# Patient Record
Sex: FEMALE | Race: OTHER | HISPANIC OR LATINO
[De-identification: names, ages, dates, MRNs, and addresses within clinical notes are randomized per-mention and may not be internally consistent; named-entity substitution may affect disease eponyms.]

---

## 2020-06-09 ENCOUNTER — RESULT REVIEW (OUTPATIENT)
Age: 25
End: 2020-06-09

## 2020-07-06 ENCOUNTER — ASOB RESULT (OUTPATIENT)
Age: 25
End: 2020-07-06

## 2020-07-06 ENCOUNTER — APPOINTMENT (OUTPATIENT)
Dept: ANTEPARTUM | Facility: CLINIC | Age: 25
End: 2020-07-06
Payer: COMMERCIAL

## 2020-07-06 PROCEDURE — 76813 OB US NUCHAL MEAS 1 GEST: CPT | Mod: 59

## 2020-07-06 PROCEDURE — 76801 OB US < 14 WKS SINGLE FETUS: CPT

## 2020-07-20 PROBLEM — Z00.00 ENCOUNTER FOR PREVENTIVE HEALTH EXAMINATION: Status: ACTIVE | Noted: 2020-07-20

## 2020-08-19 ENCOUNTER — APPOINTMENT (OUTPATIENT)
Dept: ANTEPARTUM | Facility: CLINIC | Age: 25
End: 2020-08-19
Payer: COMMERCIAL

## 2020-08-19 ENCOUNTER — ASOB RESULT (OUTPATIENT)
Age: 25
End: 2020-08-19

## 2020-08-19 PROCEDURE — 76811 OB US DETAILED SNGL FETUS: CPT

## 2020-08-19 PROCEDURE — 99241 OFFICE CONSULTATION NEW/ESTAB PATIENT 15 MIN: CPT | Mod: 25

## 2020-08-22 ENCOUNTER — TRANSCRIPTION ENCOUNTER (OUTPATIENT)
Age: 25
End: 2020-08-22

## 2020-09-15 ENCOUNTER — ASOB RESULT (OUTPATIENT)
Age: 25
End: 2020-09-15

## 2020-09-15 ENCOUNTER — APPOINTMENT (OUTPATIENT)
Dept: MATERNAL FETAL MEDICINE | Facility: CLINIC | Age: 25
End: 2020-09-15

## 2021-01-09 ENCOUNTER — INPATIENT (INPATIENT)
Facility: HOSPITAL | Age: 26
LOS: 0 days | Discharge: HOME | End: 2021-01-10
Attending: OBSTETRICS & GYNECOLOGY | Admitting: OBSTETRICS & GYNECOLOGY

## 2021-01-09 ENCOUNTER — TRANSCRIPTION ENCOUNTER (OUTPATIENT)
Age: 26
End: 2021-01-09

## 2021-01-09 ENCOUNTER — OUTPATIENT (OUTPATIENT)
Dept: INPATIENT UNIT | Facility: HOSPITAL | Age: 26
LOS: 1 days | Discharge: HOME | End: 2021-01-09

## 2021-01-09 VITALS
TEMPERATURE: 99 F | SYSTOLIC BLOOD PRESSURE: 105 MMHG | DIASTOLIC BLOOD PRESSURE: 65 MMHG | RESPIRATION RATE: 15 BRPM | HEART RATE: 83 BPM

## 2021-01-09 VITALS — TEMPERATURE: 99 F

## 2021-01-09 VITALS
SYSTOLIC BLOOD PRESSURE: 109 MMHG | HEART RATE: 71 BPM | TEMPERATURE: 99 F | RESPIRATION RATE: 16 BRPM | DIASTOLIC BLOOD PRESSURE: 66 MMHG

## 2021-01-09 DIAGNOSIS — Z3A.00 WEEKS OF GESTATION OF PREGNANCY NOT SPECIFIED: ICD-10-CM

## 2021-01-09 DIAGNOSIS — O26.899 OTHER SPECIFIED PREGNANCY RELATED CONDITIONS, UNSPECIFIED TRIMESTER: ICD-10-CM

## 2021-01-09 DIAGNOSIS — Z90.89 ACQUIRED ABSENCE OF OTHER ORGANS: Chronic | ICD-10-CM

## 2021-01-09 LAB
BASOPHILS # BLD AUTO: 0.05 K/UL — SIGNIFICANT CHANGE UP (ref 0–0.2)
BASOPHILS NFR BLD AUTO: 0.4 % — SIGNIFICANT CHANGE UP (ref 0–2)
BLD GP AB SCN SERPL QL: NEGATIVE — SIGNIFICANT CHANGE UP
EOSINOPHIL # BLD AUTO: 0.12 K/UL — SIGNIFICANT CHANGE UP (ref 0–0.5)
EOSINOPHIL NFR BLD AUTO: 0.9 % — SIGNIFICANT CHANGE UP (ref 0–6)
HCT VFR BLD CALC: 44.2 % — SIGNIFICANT CHANGE UP (ref 34.5–45)
HGB BLD-MCNC: 14.8 G/DL — SIGNIFICANT CHANGE UP (ref 11.5–15.5)
IANC: 10.22 K/UL — HIGH (ref 1.5–8.5)
IMM GRANULOCYTES NFR BLD AUTO: 1.5 % — SIGNIFICANT CHANGE UP (ref 0–1.5)
LYMPHOCYTES # BLD AUTO: 16.1 % — SIGNIFICANT CHANGE UP (ref 13–44)
LYMPHOCYTES # BLD AUTO: 2.21 K/UL — SIGNIFICANT CHANGE UP (ref 1–3.3)
MCHC RBC-ENTMCNC: 30.8 PG — SIGNIFICANT CHANGE UP (ref 27–34)
MCHC RBC-ENTMCNC: 33.5 GM/DL — SIGNIFICANT CHANGE UP (ref 32–36)
MCV RBC AUTO: 91.9 FL — SIGNIFICANT CHANGE UP (ref 80–100)
MONOCYTES # BLD AUTO: 0.91 K/UL — HIGH (ref 0–0.9)
MONOCYTES NFR BLD AUTO: 6.6 % — SIGNIFICANT CHANGE UP (ref 2–14)
NEUTROPHILS # BLD AUTO: 10.22 K/UL — HIGH (ref 1.8–7.4)
NEUTROPHILS NFR BLD AUTO: 74.5 % — SIGNIFICANT CHANGE UP (ref 43–77)
NRBC # BLD: 0 /100 WBCS — SIGNIFICANT CHANGE UP
NRBC # FLD: 0 K/UL — SIGNIFICANT CHANGE UP
PLATELET # BLD AUTO: 220 K/UL — SIGNIFICANT CHANGE UP (ref 150–400)
RBC # BLD: 4.81 M/UL — SIGNIFICANT CHANGE UP (ref 3.8–5.2)
RBC # FLD: 12.8 % — SIGNIFICANT CHANGE UP (ref 10.3–14.5)
RH IG SCN BLD-IMP: POSITIVE — SIGNIFICANT CHANGE UP
SARS-COV-2 RNA SPEC QL NAA+PROBE: DETECTED
WBC # BLD: 13.71 K/UL — HIGH (ref 3.8–10.5)
WBC # FLD AUTO: 13.71 K/UL — HIGH (ref 3.8–10.5)

## 2021-01-09 RX ORDER — SODIUM CHLORIDE 9 MG/ML
1000 INJECTION, SOLUTION INTRAVENOUS
Refills: 0 | Status: DISCONTINUED | OUTPATIENT
Start: 2021-01-09 | End: 2021-01-10

## 2021-01-09 RX ORDER — MORPHINE SULFATE 50 MG/1
4 CAPSULE, EXTENDED RELEASE ORAL ONCE
Refills: 0 | Status: DISCONTINUED | OUTPATIENT
Start: 2021-01-09 | End: 2021-01-09

## 2021-01-09 RX ORDER — OXYTOCIN 10 UNIT/ML
333.33 VIAL (ML) INJECTION
Qty: 20 | Refills: 0 | Status: DISCONTINUED | OUTPATIENT
Start: 2021-01-09 | End: 2021-01-10

## 2021-01-09 RX ADMIN — SODIUM CHLORIDE 125 MILLILITER(S): 9 INJECTION, SOLUTION INTRAVENOUS at 19:30

## 2021-01-09 RX ADMIN — MORPHINE SULFATE 4 MILLIGRAM(S): 50 CAPSULE, EXTENDED RELEASE ORAL at 19:10

## 2021-01-09 RX ADMIN — MORPHINE SULFATE 4 MILLIGRAM(S): 50 CAPSULE, EXTENDED RELEASE ORAL at 19:09

## 2021-01-09 NOTE — OB PROVIDER TRIAGE NOTE - NSHPPHYSICALEXAM_GEN_ALL_CORE
Assessment reveals VSS, abdomen soft, NT, gravid.   VE 0.5/50/-3 ( exam unchanged )  Cat 1 FHT, ctx 2-6 on toco  EFW 7.0 by palp  Vtx confirmed by sono    Paitent desires pain management.

## 2021-01-09 NOTE — OB RN TRIAGE NOTE - NS_TRIAGEMEDICALSCREENINGPERFORMEDBY_OBGYN_ALL_OB_FT
pellicane O-Z Plasty Text: The defect edges were debeveled with a #15 scalpel blade.  Given the location of the defect, shape of the defect and the proximity to free margins an O-Z plasty (double transposition flap) was deemed most appropriate.  Using a sterile surgical marker, the appropriate transposition flaps were drawn incorporating the defect and placing the expected incisions within the relaxed skin tension lines where possible.    The area thus outlined was incised deep to adipose tissue with a #15 scalpel blade.  The skin margins were undermined to an appropriate distance in all directions utilizing iris scissors.  Hemostasis was achieved with electrocautery.  The flaps were then transposed into place, one clockwise and the other counterclockwise, and anchored with interrupted buried subcutaneous sutures.

## 2021-01-09 NOTE — OB PROVIDER TRIAGE NOTE - NSHPPHYSICALEXAM_GEN_ALL_CORE
Assessment reveals VSS, abdomen soft, NT, gravid.  VE 0/50/-3 Assessment reveals VSS, abdomen soft, NT, gravid.  VE 0.5/50/-3  Cat 1 FHT, ctx 2-3 on toco

## 2021-01-09 NOTE — OB PROVIDER TRIAGE NOTE - NSOBPROVIDERNOTE_OBGYN_ALL_OB_FT
Plan D/W Dr. Grigsby, early labor  Return for increase in contraction frequency/intensity, leaking of fluid, vaginal bleeding or decrease in fetal movement.

## 2021-01-09 NOTE — OB PROVIDER H&P - HISTORY OF PRESENT ILLNESS
26yo  @ 39.5 presents with c/o painful contractions every 7 minutes. Denies LOF, VB and reports GFM, Was seen earlier today and was dc'd home at 0.5/50. Reports severity of contractions has worsened.  Reports positive COVID from 1/3. At time of disgnosis patient reported dry cough. At this time reports symptoms as no taste and no smell.     H/O 2010 Tonsillectomy    AP course uncomplicated  GBS negative  26yo  @ 39.5 presents with c/o painful contractions every 7 minutes. Denies LOF, VB and reports GFM, Was seen earlier today and was dc'd home at 0.5/50. Reports severity of contractions has worsened.  Reports positive COVID from 1/3. At time of disgnosis patient reported dry cough. At this time reports symptoms as no taste and no smell.   Partner COVID pos from 1/3. At this time reports mild cough    H/O 2010 Tonsillectomy    AP course uncomplicated  GBS negative

## 2021-01-09 NOTE — OB RN TRIAGE NOTE - NS_TRIAGEINITIALRNASSESSMENT_OBGYN_ALL_OB_FT
(J20.9) Acute bronchitis, unspecified organism  Comment:    Plan: fluticasone-salmeterol (ADVAIR) 100-50 MCG/DOSE        diskus inhaler, azithromycin (ZITHROMAX) 500 MG        tablet             (J30.1) Chronic seasonal allergic rhinitis due to pollen  Comment:    Plan: montelukast (SINGULAIR) 10 MG tablet             (J45.30) Mild persistent asthma without complication  Comment:    Plan: montelukast (SINGULAIR) 10 MG tablet             (R06.2) Wheezing  Comment:    Plan: albuterol (PROAIR HFA/PROVENTIL HFA/VENTOLIN         HFA) 108 (90 BASE) MCG/ACT Inhaler             (J45.21) Mild intermittent asthma with acute exacerbation  Comment:    Plan: albuterol (PROAIR HFA/PROVENTIL HFA/VENTOLIN         HFA) 108 (90 BASE) MCG/ACT Inhaler, predniSONE         (DELTASONE) 20 MG tablet             (E11.21) Type 2 diabetes mellitus with diabetic nephropathy, without long-term current use of insulin (H)  Comment:    Plan: glipiZIDE (GLUCOTROL XL) 10 MG 24 hr tablet                    cr

## 2021-01-09 NOTE — OB PROVIDER TRIAGE NOTE - HISTORY OF PRESENT ILLNESS
26yo  @ 39.5 presents with c/o contractions every few minutes. Denies LOF, VB and reports GFM.  COVID positive 1/3- at this time reports no taste no smell. FOB positive as well.     H/O Tonsillectomy     AP course uncomplicated

## 2021-01-09 NOTE — OB PROVIDER TRIAGE NOTE - ADDITIONAL INSTRUCTIONS
Return for increase in contraction frequency/intensity, leaking of fluid, vaginal bleeding or decrease in fetal movement.

## 2021-01-10 ENCOUNTER — INPATIENT (INPATIENT)
Facility: HOSPITAL | Age: 26
LOS: 1 days | Discharge: HOME | End: 2021-01-12
Attending: OBSTETRICS & GYNECOLOGY | Admitting: OBSTETRICS & GYNECOLOGY

## 2021-01-10 ENCOUNTER — TRANSCRIPTION ENCOUNTER (OUTPATIENT)
Age: 26
End: 2021-01-10

## 2021-01-10 VITALS — TEMPERATURE: 97 F | DIASTOLIC BLOOD PRESSURE: 62 MMHG | HEART RATE: 74 BPM | SYSTOLIC BLOOD PRESSURE: 99 MMHG

## 2021-01-10 VITALS — TEMPERATURE: 98 F

## 2021-01-10 DIAGNOSIS — Z90.89 ACQUIRED ABSENCE OF OTHER ORGANS: Chronic | ICD-10-CM

## 2021-01-10 DIAGNOSIS — O26.899 OTHER SPECIFIED PREGNANCY RELATED CONDITIONS, UNSPECIFIED TRIMESTER: ICD-10-CM

## 2021-01-10 DIAGNOSIS — Z3A.00 WEEKS OF GESTATION OF PREGNANCY NOT SPECIFIED: ICD-10-CM

## 2021-01-10 LAB
BLD GP AB SCN SERPL QL: NEGATIVE — SIGNIFICANT CHANGE UP
RH IG SCN BLD-IMP: POSITIVE — SIGNIFICANT CHANGE UP
SARS-COV-2 IGG SERPL QL IA: NEGATIVE — SIGNIFICANT CHANGE UP
SARS-COV-2 IGM SERPL IA-ACNC: 0.09 INDEX — SIGNIFICANT CHANGE UP

## 2021-01-10 RX ORDER — BUTORPHANOL TARTRATE 2 MG/ML
2 INJECTION, SOLUTION INTRAMUSCULAR; INTRAVENOUS ONCE
Refills: 0 | Status: DISCONTINUED | OUTPATIENT
Start: 2021-01-10 | End: 2021-01-10

## 2021-01-10 RX ORDER — TETANUS TOXOID, REDUCED DIPHTHERIA TOXOID AND ACELLULAR PERTUSSIS VACCINE, ADSORBED 5; 2.5; 8; 8; 2.5 [IU]/.5ML; [IU]/.5ML; UG/.5ML; UG/.5ML; UG/.5ML
0.5 SUSPENSION INTRAMUSCULAR ONCE
Refills: 0 | Status: DISCONTINUED | OUTPATIENT
Start: 2021-01-10 | End: 2021-01-12

## 2021-01-10 RX ORDER — HYDROCORTISONE 1 %
1 OINTMENT (GRAM) TOPICAL EVERY 6 HOURS
Refills: 0 | Status: DISCONTINUED | OUTPATIENT
Start: 2021-01-10 | End: 2021-01-12

## 2021-01-10 RX ORDER — SODIUM CHLORIDE 9 MG/ML
1000 INJECTION, SOLUTION INTRAVENOUS
Refills: 0 | Status: DISCONTINUED | OUTPATIENT
Start: 2021-01-10 | End: 2021-01-10

## 2021-01-10 RX ORDER — SIMETHICONE 80 MG/1
80 TABLET, CHEWABLE ORAL EVERY 4 HOURS
Refills: 0 | Status: DISCONTINUED | OUTPATIENT
Start: 2021-01-10 | End: 2021-01-12

## 2021-01-10 RX ORDER — DIBUCAINE 1 %
1 OINTMENT (GRAM) RECTAL EVERY 6 HOURS
Refills: 0 | Status: DISCONTINUED | OUTPATIENT
Start: 2021-01-10 | End: 2021-01-12

## 2021-01-10 RX ORDER — OXYCODONE HYDROCHLORIDE 5 MG/1
5 TABLET ORAL ONCE
Refills: 0 | Status: DISCONTINUED | OUTPATIENT
Start: 2021-01-10 | End: 2021-01-12

## 2021-01-10 RX ORDER — PRAMOXINE HYDROCHLORIDE 150 MG/15G
1 AEROSOL, FOAM RECTAL EVERY 4 HOURS
Refills: 0 | Status: DISCONTINUED | OUTPATIENT
Start: 2021-01-10 | End: 2021-01-12

## 2021-01-10 RX ORDER — AER TRAVELER 0.5 G/1
1 SOLUTION RECTAL; TOPICAL EVERY 4 HOURS
Refills: 0 | Status: DISCONTINUED | OUTPATIENT
Start: 2021-01-10 | End: 2021-01-12

## 2021-01-10 RX ORDER — ACETAMINOPHEN 500 MG
975 TABLET ORAL
Refills: 0 | Status: DISCONTINUED | OUTPATIENT
Start: 2021-01-10 | End: 2021-01-12

## 2021-01-10 RX ORDER — SODIUM CHLORIDE 9 MG/ML
1000 INJECTION, SOLUTION INTRAVENOUS ONCE
Refills: 0 | Status: COMPLETED | OUTPATIENT
Start: 2021-01-10 | End: 2021-01-10

## 2021-01-10 RX ORDER — OXYTOCIN 10 UNIT/ML
333.33 VIAL (ML) INJECTION
Qty: 20 | Refills: 0 | Status: DISCONTINUED | OUTPATIENT
Start: 2021-01-10 | End: 2021-01-10

## 2021-01-10 RX ORDER — OXYTOCIN 10 UNIT/ML
333.33 VIAL (ML) INJECTION
Qty: 20 | Refills: 0 | Status: DISCONTINUED | OUTPATIENT
Start: 2021-01-10 | End: 2021-01-11

## 2021-01-10 RX ORDER — IBUPROFEN 200 MG
600 TABLET ORAL EVERY 6 HOURS
Refills: 0 | Status: COMPLETED | OUTPATIENT
Start: 2021-01-10 | End: 2021-12-09

## 2021-01-10 RX ORDER — OXYCODONE HYDROCHLORIDE 5 MG/1
5 TABLET ORAL
Refills: 0 | Status: DISCONTINUED | OUTPATIENT
Start: 2021-01-10 | End: 2021-01-12

## 2021-01-10 RX ORDER — BENZOCAINE 10 %
1 GEL (GRAM) MUCOUS MEMBRANE EVERY 6 HOURS
Refills: 0 | Status: DISCONTINUED | OUTPATIENT
Start: 2021-01-10 | End: 2021-01-12

## 2021-01-10 RX ORDER — DIPHENHYDRAMINE HCL 50 MG
25 CAPSULE ORAL EVERY 6 HOURS
Refills: 0 | Status: DISCONTINUED | OUTPATIENT
Start: 2021-01-10 | End: 2021-01-12

## 2021-01-10 RX ORDER — MAGNESIUM HYDROXIDE 400 MG/1
30 TABLET, CHEWABLE ORAL
Refills: 0 | Status: DISCONTINUED | OUTPATIENT
Start: 2021-01-10 | End: 2021-01-12

## 2021-01-10 RX ORDER — LANOLIN
1 OINTMENT (GRAM) TOPICAL EVERY 6 HOURS
Refills: 0 | Status: DISCONTINUED | OUTPATIENT
Start: 2021-01-10 | End: 2021-01-12

## 2021-01-10 RX ORDER — SODIUM CHLORIDE 9 MG/ML
3 INJECTION INTRAMUSCULAR; INTRAVENOUS; SUBCUTANEOUS EVERY 8 HOURS
Refills: 0 | Status: DISCONTINUED | OUTPATIENT
Start: 2021-01-10 | End: 2021-01-12

## 2021-01-10 RX ORDER — KETOROLAC TROMETHAMINE 30 MG/ML
30 SYRINGE (ML) INJECTION ONCE
Refills: 0 | Status: DISCONTINUED | OUTPATIENT
Start: 2021-01-10 | End: 2021-01-10

## 2021-01-10 RX ADMIN — BUTORPHANOL TARTRATE 2 MILLIGRAM(S): 2 INJECTION, SOLUTION INTRAMUSCULAR; INTRAVENOUS at 06:47

## 2021-01-10 RX ADMIN — SODIUM CHLORIDE 125 MILLILITER(S): 9 INJECTION, SOLUTION INTRAVENOUS at 06:32

## 2021-01-10 RX ADMIN — SODIUM CHLORIDE 1000 MILLILITER(S): 9 INJECTION, SOLUTION INTRAVENOUS at 01:47

## 2021-01-10 RX ADMIN — Medication 1000 MILLIUNIT(S)/MIN: at 22:33

## 2021-01-10 RX ADMIN — Medication 30 MILLIGRAM(S): at 22:50

## 2021-01-10 NOTE — DISCHARGE NOTE ANTEPARTUM - MEDICATION SUMMARY - MEDICATIONS TO TAKE
I will START or STAY ON the medications listed below when I get home from the hospital:    PNV Prenatal oral tablet  -- Indication: For Weeks of gestation of pregnancy not specified

## 2021-01-10 NOTE — DISCHARGE NOTE OB - PROVIDER TOKENS
PROVIDER:[TOKEN:[[id number]]] PROVIDER:[TOKEN:[[id number]]],PROVIDER:[TOKEN:[[id number]]]

## 2021-01-10 NOTE — DISCHARGE NOTE ANTEPARTUM - PATIENT PORTAL LINK FT
You can access the FollowMyHealth Patient Portal offered by [facility] by registering at the following website: [email or web address]. By joining [employer]’s FollowMyHealth portal, you will also be able to view your health information using other applications (apps) compatible with our system.

## 2021-01-10 NOTE — OB PROVIDER H&P - ASSESSMENT
25y White female  at 39w6d recently discharged from the hospital, returns c/o very strong and painful contractions.  Reports +FM, no vaginal bleeding, no ROM or LOF  [facility] care: [facility]  GBS: Negative  Reports positive COVID from 1/3. At time of diagnosis patient reported dry cough. At this time reports symptoms as no taste and no smell.   Partner COVID pos from 1/3. At this time reports mild cough    GYN: Denies any h/o STDs, fibroids, ovarian Cyst, or abnormal pap smear  OBH:   PAST MEDICAL: Covid 19+ since 1/3/21  PAST SURGICAL HISTORY:  Tonsillectomy    No Known Allergies    MEDICATIONS: PNV    T(C): 36.9 (01-10-21 @ 04:26), Max: 37.0 (21 @ 08:58)  HR: 73 (01-10-21 @ 04:26) (64 - 88)  BP: 102/66 (01-10-21 @ 04:26) (96/58 - 109/66)  RR: 17 (01-10-21 @ 04:26) (15 - 18)  SpO2: 99% (01-10-21 @ 01:43) (94% - 100%)    Heart: RRR  Lungs: CTA  Abdomen: Gravid, soft, NT    NST: Reactive with moderate variability, Category 1 tracing  [place]: Irregular contractions  VE: 50/-3, intact membranes    A/P: 25y White female  at 39w6d with prodromal labor  D/w Dr Grigsby  -Admit to labor and delivery  -Pain Management:   -Cont EFM/[place]  -Admission labs: CBC, RPR, T&S  -IV hydration  -Clear liquid diet

## 2021-01-10 NOTE — OB PROVIDER H&P - HISTORY OF PRESENT ILLNESS
25y White female  at 39w6d recently discharged from the hospital, returns c/o very strong and painful contractions, requesting pain management  Reports +FM, no vaginal bleeding, no ROM or LOF  Pren[facility] care: [facility]  GBS: Negative  Reports positive COVID from 1/3. At time of diagnosis patient reported dry cough. At this time reports symptoms as no taste and no smell.   Partner COVID pos from 1/3. At this time reports mild cough

## 2021-01-10 NOTE — DISCHARGE NOTE OB - HOSPITAL COURSE
patient was given IV stadol and had 2 decelerations on tracing so decision was made to induce for category 2. PO cytotec given. Patient progressed to fully dilated.  viable infant

## 2021-01-10 NOTE — DISCHARGE NOTE OB - CARE PROVIDER_API CALL
Sarah Grigsby)  Obstetrics and Gynecology  [address] [place] [address]  Phone: [phone]  Fax: [phone]  Follow Up Time:    Sarah Grigsby)  Obstetrics and Gynecology  [address]  [place] [address]  Phone: [phone]  Fax: [phone]  Follow Up Time:     Nay Rodriguez)  Obstetrics and Gynecology  [address] [place] [address]  Phone: [phone]  Fax: [phone]  Follow Up Time:

## 2021-01-10 NOTE — OB NEONATOLOGY/PEDIATRICIAN DELIVERY SUMMARY - NSPEDSNEONOTESA_OBGYN_ALL_OB_FT
Baby is a 39.6 wk GA M born to a 24 y/o  mother via  IOL for cat 2. Maternal history uncomplicated. Prenatal history uncomplicated. Maternal BT O+. PNL neg, NR, and immune. GBS neg on . SROM at 1820 on 1/10, bloody fluids. Baby born stunned, deep suction and tactile stim performed with response from baby. WDSS. Apgars 6/9. EOS 0.30. Mom plans to breastfeed, would like hepB and circ. COVID status neg.

## 2021-01-10 NOTE — DISCHARGE NOTE OB - CARE PLAN
Principal Discharge DX:	Vaginal delivery  Goal:	return to normal DLs  Assessment and plan of treatment:	stable; vaginal rest

## 2021-01-10 NOTE — OB PROVIDER DELIVERY SUMMARY - NSPROVIDERDELIVERYNOTE_OBGYN_ALL_OB_FT
viable male infant. Infant suctioned for thick meconium then cord clamped and cut and infant placed in [facility] with pediatricians in attendance. Placenta delivered via CCT. Second degree perineal laceration repaired with 2-0 chromic. Patient tolerated procedure well.

## 2021-01-10 NOTE — DISCHARGE NOTE ANTEPARTUM - HOSPITAL COURSE
26yo  @ 39.5 presents with c/o painful contractions every 7 minutes. She was admitted for therapeutic rest and received morphine.   After 6 hours, the patient was re-examined and found to be the same exam.   Patient was discharged in stable condition, given labor precautions, and instructed to resume normal prenatal care.

## 2021-01-10 NOTE — DISCHARGE NOTE OB - CARE PROVIDERS DIRECT ADDRESSES
,DirectAddress_Unknown ,DirectAddress_Unknown,[email or web address]

## 2021-01-10 NOTE — OB PROVIDER LABOR PROGRESS NOTE - NS_SUBJECTIVE/OBJECTIVE_OBGYN_ALL_OB_FT
patient c/o rectal pressure  VSS. Afebrile
Patient seen and examined for cervical change.
R1 OB Labor Note    S: Patient seen and examined at bedside.     T(C): 37.3 (01-10-21 @ 18:32), Max: 37.4 (01-10-21 @ 16:33)  HR: 72 (01-10-21 @ 20:03) (61 - 110)  BP: 108/64 (01-10-21 @ 20:03) (83/35 - 125/74)  BP(mean): --  ABP: --  ABP(mean): --  RR: 18 (01-10-21 @ 06:53) (17 - 18)  SpO2: 99% (01-10-21 @ 20:08) (90% - 100%)  Wt(kg): --  CVP(mm Hg): --  CI: --  CAPILLARY BLOOD GLUCOSE       N/A      01-09 @ 07:01  -  01-10 @ 07:00  --------------------------------------------------------  IN:    Lactated Ringers: 125 mL  Total IN: 125 mL    OUT:  Total OUT: 0 mL    Total NET: 125 mL      01-10 @ 07:01  -  01-10 @ 20:10  --------------------------------------------------------  IN:    Lactated Ringers: 2375 mL  Total IN: 2375 mL    OUT:    Indwelling Catheter - Urethral (mL): 1400 mL  Total OUT: 1400 mL    Total NET: 975 mL

## 2021-01-10 NOTE — DISCHARGE NOTE OB - PATIENT PORTAL LINK FT
You can access the FollowMyHealth Patient Portal offered by  by registering at the following website: [email or web address]. By joining [employer]’s FollowMyHealth portal, you will also be able to view your health information using other applications (apps) compatible with our system.

## 2021-01-10 NOTE — OB PROVIDER H&P - PROBLEM SELECTOR PLAN 1
D/w Dr Grigsby  -Admit to labor and delivery  -Pain Management:   -Cont EFM/[place]  -Admission labs: CBC, RPR, T&S  -IV hydration  -Clear liquid diet

## 2021-01-10 NOTE — DISCHARGE NOTE ANTEPARTUM - PLAN OF CARE
Please resume routine prenatal care as instructed.  If you have contractions every five minutes for 1 hour, vaginal bleeding, leakage of fluid or you don't feel the baby move please call your doctor or come to the emergency department. resume prenatal care

## 2021-01-10 NOTE — OB PROVIDER LABOR PROGRESS NOTE - ASSESSMENT
IUP at term with + COVID for vaginal delivery
Induction for Category 2 tracing  - 4PO Cytotec  [place] Sow PGY3  d/w Dr. Grigsby
A/P:   - Labor: IOL for NRFHT. Pt COVID+ (asym). S/p PO. Pt making change and dalila. If ctx space, start Pit.  - Fetus: Cat 1  - GBS: neg  - Pain: epi in situ    Bria Walsh, PGY-1  d/w Dr. Grigsby

## 2021-01-10 NOTE — DISCHARGE NOTE ANTEPARTUM - CARE PLAN
Principal Discharge DX:	Pregnancy  Goal:	resume prenatal care  Assessment and plan of treatment:	Please resume routine prenatal care as instructed.  If you have contractions every five minutes for 1 hour, vaginal bleeding, leakage of fluid or you don't feel the baby move please call your doctor or come to the emergency department.

## 2021-01-10 NOTE — CHART NOTE - NSCHARTNOTEFT_GEN_A_CORE
S:  Pt seen and examined for cervical change due to deceleration     O:   T(C): 37.0 (08:35)  HR: 90 (12:32)  BP: 96/51 (12:32)  RR: 18 (06:53)  SpO2: 99% (12:23)  SVE: 2/70/-3  EFM: baseline 130 mod jeannie +accel + decel lasting 4 min with courtney to 80s, returned to baseline spontaneously  [place]: irregular    A/P: 25y P0 IOL Cat II  -c/w po cytotec  - CRNA called for BP support s/p epi due to hypotension    TLal PGY4

## 2021-01-10 NOTE — OB RN DELIVERY SUMMARY - NS_SEPSISRSKCALC_OBGYN_ALL_OB_FT
EOS calculated successfully. EOS Risk Factor: 0.5/1000 live births ([facility] national incidence); GA=39w6d; Temp=99.68; ROM=1.1; GBS='Negative'; Antibiotics='No antibiotics or any antibiotics < 2 hrs prior to birth'

## 2021-01-10 NOTE — DISCHARGE NOTE ANTEPARTUM - CARE PROVIDER_API CALL
Sarah Grigsby)  Obstetrics and Gynecology  [address] [place] [address]  Phone: [phone]  Fax: [phone]  Follow Up Time:

## 2021-01-10 NOTE — OB PROVIDER H&P - NSHPLABSRESULTS_GEN_ALL_CORE
Admission labs sent                         14.8   13.71 )-----------( 220      ( 09 Jan 2021 19:51 )             44.2

## 2021-01-11 LAB
T PALLIDUM AB TITR SER: NEGATIVE — SIGNIFICANT CHANGE UP
T PALLIDUM AB TITR SER: NEGATIVE — SIGNIFICANT CHANGE UP

## 2021-01-11 RX ORDER — IBUPROFEN 200 MG
600 TABLET ORAL EVERY 6 HOURS
Refills: 0 | Status: DISCONTINUED | OUTPATIENT
Start: 2021-01-11 | End: 2021-01-12

## 2021-01-11 RX ADMIN — SODIUM CHLORIDE 3 MILLILITER(S): 9 INJECTION INTRAMUSCULAR; INTRAVENOUS; SUBCUTANEOUS at 13:53

## 2021-01-11 RX ADMIN — SODIUM CHLORIDE 3 MILLILITER(S): 9 INJECTION INTRAMUSCULAR; INTRAVENOUS; SUBCUTANEOUS at 06:52

## 2021-01-11 RX ADMIN — Medication 975 MILLIGRAM(S): at 08:53

## 2021-01-11 RX ADMIN — OXYCODONE HYDROCHLORIDE 5 MILLIGRAM(S): 5 TABLET ORAL at 08:53

## 2021-01-11 RX ADMIN — Medication 1 TABLET(S): at 09:26

## 2021-01-11 RX ADMIN — Medication 975 MILLIGRAM(S): at 22:29

## 2021-01-11 RX ADMIN — Medication 975 MILLIGRAM(S): at 02:45

## 2021-01-11 RX ADMIN — Medication 600 MILLIGRAM(S): at 06:52

## 2021-01-11 RX ADMIN — Medication 975 MILLIGRAM(S): at 18:13

## 2021-01-11 RX ADMIN — OXYCODONE HYDROCHLORIDE 5 MILLIGRAM(S): 5 TABLET ORAL at 18:00

## 2021-01-12 VITALS
TEMPERATURE: 97 F | OXYGEN SATURATION: 98 % | HEART RATE: 76 BPM | RESPIRATION RATE: 16 BRPM | DIASTOLIC BLOOD PRESSURE: 65 MMHG | SYSTOLIC BLOOD PRESSURE: 111 MMHG

## 2021-01-12 RX ADMIN — OXYCODONE HYDROCHLORIDE 5 MILLIGRAM(S): 5 TABLET ORAL at 09:27

## 2021-01-12 RX ADMIN — Medication 975 MILLIGRAM(S): at 09:27

## 2021-01-12 RX ADMIN — Medication 1 TABLET(S): at 18:17

## 2021-01-12 NOTE — LACTATION INITIAL EVALUATION - INFANT ACTIVITY
Labor Epidural    Patient location during procedure: OB  Performed By  Anesthesiologist: GUADALUPE CUELLAR  Preanesthetic Checklist  Completed: patient identified, site marked, surgical consent, pre-op evaluation, timeout performed, IV checked, risks and benefits discussed and monitors and equipment checked  Epidural Block Prep:  Pt Position:sitting  Sterile Tech:cap, gloves, mask and sterile barrier  Prep:chlorhexidine gluconate and isopropyl alcohol  Monitoring:blood pressure monitoring, continuous pulse oximetry and EKG  Epidural Block Procedure:  Approach:midline  Guidance:landmark technique and palpation technique  Location:L4-L5  Needle Type:Tuohy  Needle Gauge:17  Loss of Resistance: 7cm  Cath Depth at skin:12 cm  Paresthesia: left and transient  Aspiration:negative  Test Dose:negative  Post Assessment:  Dressing:occlusive dressing applied and secured with tape  Pt Tolerance:patient tolerated the procedure well with no apparent complications  Complications:no            
active with stimulation

## 2021-01-12 NOTE — LACTATION INITIAL EVALUATION - LACTATION INTERVENTIONS
Primary RN made aware of consult and plan./initiate skin to skin/initiate hand expression routine/initiate/review early breastfeeding management guidelines/initiate/review techniques for position and latch/post discharge community resources provided/initiate/review breast massage/compression

## 2021-01-12 NOTE — PROGRESS NOTE ADULT - PROBLEM SELECTOR PLAN 1
- Pain well controlled, continue current pain regimen  - Increase ambulation  - Continue regular diet  - Discharge planning     Carolina Mora, PGY1

## 2021-01-12 NOTE — PROGRESS NOTE ADULT - SUBJECTIVE AND OBJECTIVE BOX
S: Patient doing well.   Pain controlled.  +OOB.  +void.  Tolerating PO.  Lochia WNL.    O: Vital Signs Last 24 Hrs  T(C): 36.3 (2021 11:22), Max: 37.6 (10 Aubrey 2021 21:02)  T(F): 97.3 (2021 11:22), Max: 99.68 (10 Aubrey 2021 21:02)  HR: 73 (2021 11:22) (61 - 154)  BP: 95/52 (2021 11:22) (83/35 - 125/74)  BP(mean): --  RR: 17 (2021 11:22) (17 - 18)  SpO2: 98% (2021 11:22) (89% - 100%)    Gen: NAD  Abd: soft, NT, ND.   fundus firm below umbilicus, NT.  Ext: no tenderness B/L, negative Juani's sign    Labs:                        14.8   13.71 )-----------( 220      ( 2021 19:51 )             44.2       ABO Interpretation: O (01-10 @ 08:27)    Rh Interpretation: Positive (01-10 @ 08:27)    Antibody Screen Negative            A: 25y PPD#1 s/p  doing well.   -Continue routine postpartum care.  -Discharge planning.     MD Gladys
OB Progress Note:  PPD#2    S: 26yo  PPD#2 s/p . Patient feels well. Pain is well controlled, tolerating regular diet, passing flatus, voiding spontaneously, ambulating without difficulty. Denies heavy vaginal bleeding, CP/SOB, N/V, lightheadedness/dizziness.     O:  Vitals:  Vital Signs Last 24 Hrs  T(C): 36.2 (2021 09:54), Max: 36.6 (2021 06:21)  T(F): 97.2 (2021 09:54), Max: 97.8 (2021 06:21)  HR: 74 (2021 09:54) (69 - 99)  BP: 113/74 (2021 09:54) (90/56 - 113/74)  BP(mean): --  RR: 16 (2021 09:54) (16 - 18)  SpO2: 98% (2021 09:54) (96% - 100%)    MEDICATIONS  (STANDING):  acetaminophen   Tablet .. 975 milliGRAM(s) Oral <User Schedule>  diphtheria/tetanus/pertussis (acellular) Vaccine (ADAcel) 0.5 milliLiter(s) IntraMuscular once  ibuprofen  Tablet. 600 milliGRAM(s) Oral every 6 hours  prenatal multivitamin 1 Tablet(s) Oral daily  sodium chloride 0.9% lock flush 3 milliLiter(s) IV Push every 8 hours      Labs:  Blood type: O Positive  Rubella IgG: RPR: Negative                          14.8   13.71<H> >-----------< 220    (  @ 19:51 )             44.2            Physical Exam:  General: NAD  Abdomen: soft, non-tender, non-distended, fundus firm  Vaginal: No heavy vaginal bleeding

## 2021-01-13 ENCOUNTER — NON-APPOINTMENT (OUTPATIENT)
Age: 26
End: 2021-01-13

## 2021-12-29 ENCOUNTER — RESULT REVIEW (OUTPATIENT)
Age: 26
End: 2021-12-29

## 2022-12-28 NOTE — OB RN PATIENT PROFILE - WEIGHT: TOTAL WEIGHT IN KG
BMI:   HbA1c: A1C with Estimated Average Glucose Result: 5.0 % (12-21-22 @ 08:00)    Glucose: POCT Blood Glucose.: 145 mg/dL (12-19-22 @ 08:32)    BP: 127/83 (12-27-22 @ 20:28) (120/71 - 127/83)  Lipid Panel: Date/Time: 12-21-22 @ 08:00  Cholesterol, Serum: 135  Direct LDL: --  HDL Cholesterol, Serum: 41  Total Cholesterol/HDL Ration Measurement: --  Triglycerides, Serum: 83   15

## 2023-08-15 PROBLEM — U07.1 COVID-19: Chronic | Status: ACTIVE | Noted: 2021-01-10

## 2023-08-20 ENCOUNTER — EMERGENCY (EMERGENCY)
Facility: HOSPITAL | Age: 28
LOS: 1 days | Discharge: HOME | End: 2023-08-20
Attending: EMERGENCY MEDICINE | Admitting: EMERGENCY MEDICINE
Payer: COMMERCIAL

## 2023-08-20 VITALS
RESPIRATION RATE: 18 BRPM | SYSTOLIC BLOOD PRESSURE: 93 MMHG | TEMPERATURE: 99 F | DIASTOLIC BLOOD PRESSURE: 63 MMHG | OXYGEN SATURATION: 100 % | HEART RATE: 106 BPM

## 2023-08-20 LAB
ALBUMIN SERPL ELPH-MCNC: 4.4 G/DL — SIGNIFICANT CHANGE UP (ref 3.3–5)
ALP SERPL-CCNC: 41 U/L — SIGNIFICANT CHANGE UP (ref 40–120)
ALT FLD-CCNC: 10 U/L — SIGNIFICANT CHANGE UP (ref 4–33)
ANION GAP SERPL CALC-SCNC: 11 MMOL/L — SIGNIFICANT CHANGE UP (ref 7–14)
APPEARANCE UR: ABNORMAL
AST SERPL-CCNC: 15 U/L — SIGNIFICANT CHANGE UP (ref 4–32)
BACTERIA # UR AUTO: NEGATIVE /HPF — SIGNIFICANT CHANGE UP
BASOPHILS # BLD AUTO: 0.06 K/UL — SIGNIFICANT CHANGE UP (ref 0–0.2)
BASOPHILS NFR BLD AUTO: 0.5 % — SIGNIFICANT CHANGE UP (ref 0–2)
BILIRUB SERPL-MCNC: 0.4 MG/DL — SIGNIFICANT CHANGE UP (ref 0.2–1.2)
BILIRUB UR-MCNC: ABNORMAL
BLD GP AB SCN SERPL QL: NEGATIVE — SIGNIFICANT CHANGE UP
BUN SERPL-MCNC: 10 MG/DL — SIGNIFICANT CHANGE UP (ref 7–23)
CALCIUM SERPL-MCNC: 9.6 MG/DL — SIGNIFICANT CHANGE UP (ref 8.4–10.5)
CAST: 0 /LPF — SIGNIFICANT CHANGE UP (ref 0–4)
CHLORIDE SERPL-SCNC: 98 MMOL/L — SIGNIFICANT CHANGE UP (ref 98–107)
CO2 SERPL-SCNC: 24 MMOL/L — SIGNIFICANT CHANGE UP (ref 22–31)
COLOR SPEC: SIGNIFICANT CHANGE UP
CREAT SERPL-MCNC: 0.62 MG/DL — SIGNIFICANT CHANGE UP (ref 0.5–1.3)
DIFF PNL FLD: ABNORMAL
EGFR: 124 ML/MIN/1.73M2 — SIGNIFICANT CHANGE UP
EOSINOPHIL # BLD AUTO: 0.26 K/UL — SIGNIFICANT CHANGE UP (ref 0–0.5)
EOSINOPHIL NFR BLD AUTO: 2.1 % — SIGNIFICANT CHANGE UP (ref 0–6)
GLUCOSE SERPL-MCNC: 90 MG/DL — SIGNIFICANT CHANGE UP (ref 70–99)
GLUCOSE UR QL: NEGATIVE MG/DL — SIGNIFICANT CHANGE UP
HCG SERPL-ACNC: SIGNIFICANT CHANGE UP MIU/ML
HCT VFR BLD CALC: 40.1 % — SIGNIFICANT CHANGE UP (ref 34.5–45)
HGB BLD-MCNC: 13.6 G/DL — SIGNIFICANT CHANGE UP (ref 11.5–15.5)
IANC: 9.34 K/UL — HIGH (ref 1.8–7.4)
IMM GRANULOCYTES NFR BLD AUTO: 0.4 % — SIGNIFICANT CHANGE UP (ref 0–0.9)
KETONES UR-MCNC: 15 MG/DL
LEUKOCYTE ESTERASE UR-ACNC: ABNORMAL
LYMPHOCYTES # BLD AUTO: 16.3 % — SIGNIFICANT CHANGE UP (ref 13–44)
LYMPHOCYTES # BLD AUTO: 2.05 K/UL — SIGNIFICANT CHANGE UP (ref 1–3.3)
MCHC RBC-ENTMCNC: 30.6 PG — SIGNIFICANT CHANGE UP (ref 27–34)
MCHC RBC-ENTMCNC: 33.9 GM/DL — SIGNIFICANT CHANGE UP (ref 32–36)
MCV RBC AUTO: 90.3 FL — SIGNIFICANT CHANGE UP (ref 80–100)
MONOCYTES # BLD AUTO: 0.85 K/UL — SIGNIFICANT CHANGE UP (ref 0–0.9)
MONOCYTES NFR BLD AUTO: 6.7 % — SIGNIFICANT CHANGE UP (ref 2–14)
NEUTROPHILS # BLD AUTO: 9.34 K/UL — HIGH (ref 1.8–7.4)
NEUTROPHILS NFR BLD AUTO: 74 % — SIGNIFICANT CHANGE UP (ref 43–77)
NITRITE UR-MCNC: NEGATIVE — SIGNIFICANT CHANGE UP
NRBC # BLD: 0 /100 WBCS — SIGNIFICANT CHANGE UP (ref 0–0)
NRBC # FLD: 0 K/UL — SIGNIFICANT CHANGE UP (ref 0–0)
PH UR: 6.5 — SIGNIFICANT CHANGE UP (ref 5–8)
PLATELET # BLD AUTO: 265 K/UL — SIGNIFICANT CHANGE UP (ref 150–400)
POTASSIUM SERPL-MCNC: 3.8 MMOL/L — SIGNIFICANT CHANGE UP (ref 3.5–5.3)
POTASSIUM SERPL-SCNC: 3.8 MMOL/L — SIGNIFICANT CHANGE UP (ref 3.5–5.3)
PROT SERPL-MCNC: 7.2 G/DL — SIGNIFICANT CHANGE UP (ref 6–8.3)
PROT UR-MCNC: SIGNIFICANT CHANGE UP MG/DL
RBC # BLD: 4.44 M/UL — SIGNIFICANT CHANGE UP (ref 3.8–5.2)
RBC # FLD: 11.9 % — SIGNIFICANT CHANGE UP (ref 10.3–14.5)
RBC CASTS # UR COMP ASSIST: 15 /HPF — HIGH (ref 0–4)
RH IG SCN BLD-IMP: POSITIVE — SIGNIFICANT CHANGE UP
SODIUM SERPL-SCNC: 133 MMOL/L — LOW (ref 135–145)
SP GR SPEC: 1.02 — SIGNIFICANT CHANGE UP (ref 1–1.03)
SQUAMOUS # UR AUTO: 1 /HPF — SIGNIFICANT CHANGE UP (ref 0–5)
UROBILINOGEN FLD QL: 1 MG/DL — SIGNIFICANT CHANGE UP (ref 0.2–1)
WBC # BLD: 12.61 K/UL — HIGH (ref 3.8–10.5)
WBC # FLD AUTO: 12.61 K/UL — HIGH (ref 3.8–10.5)
WBC UR QL: 3 /HPF — SIGNIFICANT CHANGE UP (ref 0–5)

## 2023-08-20 PROCEDURE — 99284 EMERGENCY DEPT VISIT MOD MDM: CPT

## 2023-08-20 PROCEDURE — 76817 TRANSVAGINAL US OBSTETRIC: CPT | Mod: 26

## 2023-08-20 RX ORDER — SODIUM CHLORIDE 9 MG/ML
1000 INJECTION INTRAMUSCULAR; INTRAVENOUS; SUBCUTANEOUS ONCE
Refills: 0 | Status: COMPLETED | OUTPATIENT
Start: 2023-08-20 | End: 2023-08-20

## 2023-08-20 RX ORDER — ONDANSETRON 8 MG/1
4 TABLET, FILM COATED ORAL ONCE
Refills: 0 | Status: COMPLETED | OUTPATIENT
Start: 2023-08-20 | End: 2023-08-20

## 2023-08-20 RX ADMIN — SODIUM CHLORIDE 1000 MILLILITER(S): 9 INJECTION INTRAMUSCULAR; INTRAVENOUS; SUBCUTANEOUS at 01:57

## 2023-08-20 RX ADMIN — ONDANSETRON 4 MILLIGRAM(S): 8 TABLET, FILM COATED ORAL at 01:58

## 2023-08-20 NOTE — ED ADULT NURSE NOTE - NSFALLUNIVINTERV_ED_ALL_ED
Bed/Stretcher in lowest position, wheels locked, appropriate side rails in place/Call bell, personal items and telephone in reach/Instruct patient to call for assistance before getting out of bed/chair/stretcher/Non-slip footwear applied when patient is off stretcher/[place] to call system/Physically safe environment - no spills, clutter or unnecessary equipment/Purposeful proactive rounding/Room/bathroom lighting operational, light cord in reach

## 2023-08-20 NOTE — ED PROVIDER NOTE - CLINICAL SUMMARY MEDICAL DECISION MAKING FREE TEXT BOX
29 y/o F  female, with no significant PMHx, currently 9 weeks pregnant by dates, presenting to ED c/o vaginal bleeding following intercourse tonight. Light spotting when wiping, has not bled through pads or tampons. Also with nausea, vomiting and difficulty tolerating PO. Plan to check basic labs, will give fluids and zofran for symptomatic relief, TVUS to confirm fetal heartbeat and r/o pregnancy complication.

## 2023-08-20 NOTE — ED PROVIDER NOTE - NSFOLLOWUPINSTRUCTIONS_ED_ALL_ED_FT
Threatened Miscarriage    A threatened miscarriage is the term for vaginal bleeding during your first 20 weeks of pregnancy but the pregnancy has not ended. If you have vaginal bleeding during this time, your health care provider will do tests to check if you are still pregnant. If the tests show you are still pregnant and the developing baby (fetus) inside your womb (uterus) is still growing, your condition is considered a threatened miscarriage. A threatened miscarriage does not mean your pregnancy will end, but it does increase the risk of losing your pregnancy. It is important to have close follow up with your obstetrician.    A copy of all of your results from the blood work and ultrasound is below.     Please follow-up with your OBGYN first thing on Monday morning and let them know you were evaluated in the emergency room.     SEEK IMMEDIATE MEDICAL CARE IF YOU HAVE ANY OF THE FOLLOWING SYMPTOMS: heavy vaginal bleeding, severe low back or abdominal cramps, fever/chills, or lightheadedness/dizziness.

## 2023-08-20 NOTE — ED ADULT TRIAGE NOTE - CHIEF COMPLAINT QUOTE
9 weeks pregnant, , 3/22, OB/GYN [place]. c/o vaginal bleeding after intercourse x 1 hour. states blood is light red, no clots. no hx

## 2023-08-20 NOTE — ED PROVIDER NOTE - PATIENT PORTAL LINK FT
You can access the FollowMyHealth Patient Portal offered by [facility] by registering at the following website: [email or web address]. By joining [employer]’s FollowMyHealth portal, you will also be able to view your health information using other applications (apps) compatible with our system.

## 2023-08-20 NOTE — ED PROVIDER NOTE - ATTENDING CONTRIBUTION TO CARE
28-year-old woman, , EGA 9 weeks 2 days by LMP  now presents with postcoital vaginal bleeding approximately 2 hours ago.  No urinary sx. No abd pain/cramping.    Vitals: afebrile, notable for mild tachycardia  Gen:  Well appearing in NAD  Head:  NC/AT  Resp: No distress   Ext: no deformities  Skin: warm and dry as visualized    Initial ED MDM: First trimester vaginal bleeding - hemodynamically stable & well appearing (other DDx: most likely threatened , other , implantation bleeding vs less likely ectopic, torsion or molar pregnancy)  Plan: 1) LABS/UA/HCG/T&S.  2) Transvaginal US.  3) reassess.  4) OBGYN consult if needed. 28-year-old woman, , EGA 9 weeks 2 days by LMP  now presents with postcoital vaginal bleeding approximately 2 hours ago. Pt has confirmed IUP this pregnancy w/ OB.  No urinary sx. No abd pain/cramping.    Vitals: afebrile, notable for mild tachycardia  Gen:  Well appearing in NAD  Head:  NC/AT  Resp: No distress   Ext: no deformities  Skin: warm and dry as visualized    Initial ED MDM: First trimester vaginal bleeding - hemodynamically stable & well appearing (other DDx: most likely threatened , other , implantation bleeding vs less likely ectopic, torsion or molar pregnancy)  Plan: 1) LABS/UA/HCG/T&S.  2) Transvaginal US.  3) reassess.  4) OBGYN consult if needed.

## 2023-08-20 NOTE — ED PROVIDER NOTE - OBJECTIVE STATEMENT
29 y/o F , currently 9 weeks pregnant by dates (LMP ) who is presenting c/o postcoital vaginal spotting that began tonight. After intercourse, went to use the bathroom and when wiping noticed a small amount of bright red blood. She has not had more bleeding other than with wiping. 27 y/o F , currently 9 weeks pregnant by dates (LMP ) who is presenting c/o postcoital vaginal spotting that began tonight. After intercourse, went to use the bathroom and when wiping noticed a small amount of bright red blood. She has not had more bleeding other than with wiping. Now with very mild LE cramping. Has been having increased nausea and difficulty tolerating PO for the past few days. Last ultrasound was 5 days ago, confirming an IUP. Was recently treated for vaginitis and yeast infection, finished medications yesterday. Denies vaginal discharge, fevers, passing of clots, back pain, chest pain or difficulty breathing. 29 y/o F , currently 9 weeks pregnant by dates (LMP ) who is presenting c/o postcoital vaginal spotting that began tonight. After intercourse, went to use the bathroom and when wiping noticed a small amount of bright red blood. She has not had more bleeding other than with wiping. Has been having increased nausea and difficulty tolerating PO for the past few days. Last ultrasound was 5 days ago, confirming an IUP. Was recently treated for vaginitis and yeast infection, finished medications yesterday. Denies vaginal discharge, fevers, passing of clots, back pain, chest pain or difficulty breathing.

## 2023-08-20 NOTE — ED PROVIDER NOTE - PHYSICAL EXAMINATION
Gen: well appearing, in no acute distress   Head: normal appearing  HEENT: normal conjunctiva, oral mucosa moist, vision grossly intact   Lung: no respiratory distress, speaking in full sentences, CTA b/l, no wheeze, crackles or rhonchi   CV: regular rate and rhythm, no murmurs  Abd: soft, mild suprapubic tenderness, no guarding or rebound   MSK: no visible deformities, ambulating without difficulty   Neuro: No focal deficits, AAOx3  Skin: Warm, no rashes   Psych: normal affect     Exam:   external genitalia appears normal, cervix appears closed, no vaginal discharge noted, pooling of blood in the vaginal canal, no clots visualized   Chaperone: JANETH Tee

## 2023-08-20 NOTE — ED ADULT NURSE NOTE - OBJECTIVE STATEMENT
Received pt in room 23. Pt is A&O x4. with no past medical history. Pt is , pt is 9 weeks pregnant. Pt came to the ED due to after having sexual intercourse with her partner she had some vaginal bleeding. Pt stated the bleeding was about 3 wipes full. Pt stated no pads were soaked or full with blood. Pt denies any clotting or cramps or abdominal pain. Pt breathing is equal and nonlabored. Pt abdomen is soft and nondistended. Pt denies any chest pain, SOB, nausea, headache, vomiting, diarrhea, fever  or chills. Pt safety maintained.

## 2023-08-20 NOTE — ED PROVIDER NOTE - PROGRESS NOTE DETAILS
Pelvic exam US with single intrauterine live pregnancy, FHR in 160s. Notable for small subchorionic hemorrhage and blood products in the vaginal canal. Patient will need close f/u on Monday with OBGYN. Will discuss return precautions.

## 2023-08-20 NOTE — ED PROVIDER NOTE - WET READ LAUNCH FT
Goal Outcome Evaluation:  Plan of Care Reviewed With: patient        Progress: improving  Outcome Summary: Patient vaginally delivered 12/1. Patient drowsy, with no complaints of pain at this time. Patient vital signs stable, voiding spontaneously. Responding to infant cues.   There are no Wet Read(s) to document.

## 2023-08-21 LAB
CULTURE RESULTS: SIGNIFICANT CHANGE UP
SPECIMEN SOURCE: SIGNIFICANT CHANGE UP

## 2023-09-13 ENCOUNTER — APPOINTMENT (OUTPATIENT)
Dept: ANTEPARTUM | Facility: CLINIC | Age: 28
End: 2023-09-13
Payer: COMMERCIAL

## 2023-09-13 ENCOUNTER — APPOINTMENT (OUTPATIENT)
Dept: OBGYN | Facility: CLINIC | Age: 28
End: 2023-09-13
Payer: COMMERCIAL

## 2023-09-13 VITALS
SYSTOLIC BLOOD PRESSURE: 100 MMHG | WEIGHT: 125 LBS | HEIGHT: 64 IN | DIASTOLIC BLOOD PRESSURE: 66 MMHG | BODY MASS INDEX: 21.34 KG/M2

## 2023-09-13 DIAGNOSIS — Z36.82 ENCOUNTER FOR ANTENATAL SCREENING FOR NUCHAL TRANSLUCENCY: ICD-10-CM

## 2023-09-13 PROCEDURE — 76813 OB US NUCHAL MEAS 1 GEST: CPT

## 2023-09-13 PROCEDURE — 99213 OFFICE O/P EST LOW 20 MIN: CPT

## 2023-09-13 PROCEDURE — 76801 OB US < 14 WKS SINGLE FETUS: CPT | Mod: 59

## 2023-09-16 LAB
ADDITIONAL US: NORMAL
CRL SCAN TWIN B: NORMAL
CRL SCAN: NORMAL
CROWN RUMP LENGTH TWIN B: NORMAL
CROWN RUMP LENGTH: 68.4 MM
DIA MOM: 1.3
DIA VALUE: 321.4 PG/ML
DOWN SYNDROME AGE RISK: NORMAL
DOWN SYNDROME INTERPRETATION: NORMAL
DOWN SYNDROME SCREENING RISK: NORMAL
FIRST TRIMESTER SCREEN COMMENTS: NORMAL
FIRST TRIMESTER SCREEN NOTE: NORMAL
FIRST TRIMESTER SCREEN RESULTS: NORMAL
FIRST TRIMESTER SCREEN TEST RESULTS: NORMAL
GEST. AGE ON COLLECTION DATE: 12.9 WEEKS
HCG MOM: 0.71
HCG VALUE: 73.7 IU/ML
MATERNAL AGE AT EDD: 29.9 YR
NT MOM TWIN B: NORMAL
NT TWIN B: NORMAL
NUCHAL TRANSLUCENCY (NT): 1.7 MM
NUCHAL TRANSLUCENCY MOM: 0.96
NUMBER OF FETUSES: 1
PAPP-A MOM: 1.52
PAPP-A VALUE: 2175.2 NG/ML
RACE: NORMAL
SONOGRAPHER ID#: NORMAL
TRISOMY 18 AGE RISK: NORMAL
TRISOMY 18 INTERPRETATION: NORMAL
TRISOMY 18 SCREENING RISK: NORMAL
WEIGHT AFP: 125 LBS

## 2023-11-20 ENCOUNTER — ASOB RESULT (OUTPATIENT)
Age: 28
End: 2023-11-20

## 2023-11-20 ENCOUNTER — APPOINTMENT (OUTPATIENT)
Dept: OBGYN | Facility: CLINIC | Age: 28
End: 2023-11-20
Payer: COMMERCIAL

## 2023-11-20 ENCOUNTER — APPOINTMENT (OUTPATIENT)
Dept: ANTEPARTUM | Facility: CLINIC | Age: 28
End: 2023-11-20
Payer: COMMERCIAL

## 2023-11-20 VITALS — WEIGHT: 135 LBS | SYSTOLIC BLOOD PRESSURE: 91 MMHG | DIASTOLIC BLOOD PRESSURE: 54 MMHG | BODY MASS INDEX: 23.17 KG/M2

## 2023-11-20 PROCEDURE — ZZZZZ: CPT

## 2023-11-20 PROCEDURE — 76805 OB US >/= 14 WKS SNGL FETUS: CPT

## 2023-11-20 PROCEDURE — 76817 TRANSVAGINAL US OBSTETRIC: CPT

## 2023-12-11 PROBLEM — Z78.9 OTHER SPECIFIED HEALTH STATUS: Chronic | Status: ACTIVE | Noted: 2023-08-20

## 2024-01-03 ENCOUNTER — APPOINTMENT (OUTPATIENT)
Dept: OBGYN | Facility: CLINIC | Age: 29
End: 2024-01-03
Payer: COMMERCIAL

## 2024-01-03 ENCOUNTER — APPOINTMENT (OUTPATIENT)
Dept: ANTEPARTUM | Facility: CLINIC | Age: 29
End: 2024-01-03
Payer: COMMERCIAL

## 2024-01-03 ENCOUNTER — ASOB RESULT (OUTPATIENT)
Age: 29
End: 2024-01-03

## 2024-01-03 VITALS — BODY MASS INDEX: 24.03 KG/M2 | SYSTOLIC BLOOD PRESSURE: 98 MMHG | DIASTOLIC BLOOD PRESSURE: 61 MMHG | WEIGHT: 140 LBS

## 2024-01-03 PROCEDURE — ZZZZZ: CPT

## 2024-01-03 PROCEDURE — 76817 TRANSVAGINAL US OBSTETRIC: CPT | Mod: 59

## 2024-01-03 PROCEDURE — 76819 FETAL BIOPHYS PROFIL W/O NST: CPT | Mod: 59

## 2024-01-03 PROCEDURE — 76816 OB US FOLLOW-UP PER FETUS: CPT

## 2024-01-03 NOTE — OB SUMMARY
[Date: _____] : Date: [unfilled]  [Gestational Age: _____] : Gestational Age: [unfilled]  [FreeTextEntry1] : pt presents to office for anatomy scan. anatomy scan done today. No gross abnormalities noted. Normal growth. Normal fluid. Normal movement. +FHR.  Left hydrosalpinx noted today on sono, no intervention needed at this time (see official sono report) -f/u PRN  [de-identified] : dn  [FreeTextEntry9] : pt presents to office for EFW. EFW done today. Normal growth - Wt 2lbs 13oz. Normal fluid. Normal movement. +FHR. No previa noted today (see official sono report)  -f/u PRN  [de-identified] : carmen

## 2024-03-16 ENCOUNTER — OUTPATIENT (OUTPATIENT)
Dept: INPATIENT UNIT | Facility: HOSPITAL | Age: 29
LOS: 1 days | Discharge: HOME | End: 2024-03-16
Payer: COMMERCIAL

## 2024-03-16 VITALS — TEMPERATURE: 98 F

## 2024-03-16 DIAGNOSIS — Z90.89 ACQUIRED ABSENCE OF OTHER ORGANS: Chronic | ICD-10-CM

## 2024-03-16 DIAGNOSIS — O26.899 OTHER SPECIFIED PREGNANCY RELATED CONDITIONS, UNSPECIFIED TRIMESTER: ICD-10-CM

## 2024-03-16 PROCEDURE — 83986 ASSAY PH BODY FLUID NOS: CPT | Mod: QW

## 2024-03-16 PROCEDURE — 99221 1ST HOSP IP/OBS SF/LOW 40: CPT | Mod: 25

## 2024-03-16 PROCEDURE — 59025 FETAL NON-STRESS TEST: CPT | Mod: 26

## 2024-03-16 NOTE — OB PROVIDER TRIAGE NOTE - NSOBPROVIDERNOTE_OBGYN_ALL_OB_FT
Fetal wellbeing reassured. No evidence of rupture of membranes, discussed findings with Dr. Triplett. Pt. d/c'd home. Pt. to follow up with next OB appointment. Pt. instructed to return to triage with increase abdominal contractions, leakage of fluid, vaginal bleeding or decrease fetal movement.

## 2024-03-16 NOTE — OB PROVIDER TRIAGE NOTE - CARE PLAN
[facility]  Progress Note     Romeo Husbands Patient Status:  Inpatient    1947 MRN [id number]   [facility] 6NE-A Attending Deni Abernathy MD   Hosp Day # 6 PCP No primary care provider on file.      Chief Complaint: [address] [place] [address] 142   < > 140 139 140   K 4.1 4.1   < > 4.4   < > 4.5  4.5 4.1 4.0    104   < > 114*   < > 109 111 114*   CO2 23.0 23.0   < > 21.0   < > 22.0 20.0* 20.0*   ALKPHO 81 85  --  84  --   --   --   --    AST 26 24  --  38*  --   --   --   --    ALT 33 31 3. Back pain chronic   1. MRI spine reviewed without sign of abscess/discitis   2. Analgesics as needed   4. Chronic systolic heart failure EF 25-30%- Compensated   5. CAD sp CABG  6. Essential hypertension- BB    7. Dyslipidemia- statin   8.  Sp AVR/ MVR 1

## 2024-03-16 NOTE — OB RN TRIAGE NOTE - FALL HARM RISK - UNIVERSAL INTERVENTIONS
Bed in lowest position, wheels locked, appropriate side rails in place/Call bell, personal items and telephone in reach/Instruct patient to call for assistance before getting out of bed or chair/Non-slip footwear when patient is out of bed/[place] to call system/Physically safe environment - no spills, clutter or unnecessary equipment/Purposeful Proactive Rounding/Room/bathroom lighting operational, light cord in reach

## 2024-03-16 NOTE — OB PROVIDER TRIAGE NOTE - HISTORY OF PRESENT ILLNESS
Pt. is a 27y/o  EGA 39.1wks reports of decrease fetal movement today and brownish discharge since 8a. Pt. denies abdominal contractions and vaginal bleeding. Pt. states being in triage she has felt fetal movement.     PNC: Dr. Hale     AP: Denies

## 2024-03-16 NOTE — OB PROVIDER TRIAGE NOTE - NS_SONODONE_OBGYN_ALL_OB
Include Pregnancy/Lactation Warning?: No Birth Control Pills Counseling: Birth Control Pill Counseling: I discussed with the patient the potential side effects of OCPs including but not limited to increased risk of stroke, heart attack, thrombophlebitis, deep venous thrombosis, hepatic adenomas, breast changes, GI upset, headaches, and depression. The patient verbalized understanding of the proper use and possible adverse effects of OCPs. All of the patient's questions and concerns were addressed. Tetracycline Counseling: Patient counseled regarding possible photosensitivity and increased risk for sunburn. Patient instructed to avoid sunlight, if possible. When exposed to sunlight, patients should wear protective clothing, sunglasses, and sunscreen. The patient was instructed to call the office immediately if the following severe adverse effects occur:  hearing changes, easy bruising/bleeding, severe headache, or vision changes. The patient verbalized understanding of the proper use and possible adverse effects of tetracycline. All of the patient's questions and concerns were addressed. Patient understands to avoid pregnancy while on therapy due to potential birth defects. Isotretinoin Pregnancy And Lactation Text: This medication is Pregnancy Category X and is considered extremely dangerous during pregnancy. It is unknown if it is excreted in breast milk. Topical Retinoid counseling:  Patient advised to apply a pea-sized amount only at bedtime and wait 30 minutes after washing their face before applying. If too drying, patient may add a non-comedogenic moisturizer. The patient verbalized understanding of the proper use and possible adverse effects of retinoids. All of the patient's questions and concerns were addressed. Topical Clindamycin Pregnancy And Lactation Text: This medication is Pregnancy Category B and is considered safe during pregnancy. It is unknown if it is excreted in breast milk. Minocycline Pregnancy And Lactation Text: This medication is Pregnancy Category D and not consider safe during pregnancy. It is also excreted in breast milk. Doxycycline Counseling:  Patient counseled regarding possible photosensitivity and increased risk for sunburn. Patient instructed to avoid sunlight, if possible. When exposed to sunlight, patients should wear protective clothing, sunglasses, and sunscreen. The patient was instructed to call the office immediately if the following severe adverse effects occur:  hearing changes, easy bruising/bleeding, severe headache, or vision changes. The patient verbalized understanding of the proper use and possible adverse effects of doxycycline. All of the patient's questions and concerns were addressed. Yes Winlevi Pregnancy And Lactation Text: This medication is considered safe during pregnancy and breastfeeding. Detail Level: Detailed Tazorac Counseling:  Patient advised that medication is irritating and drying. Patient may need to apply sparingly and wash off after an hour before eventually leaving it on overnight. The patient verbalized understanding of the proper use and possible adverse effects of tazorac. All of the patient's questions and concerns were addressed. High Dose Vitamin A Counseling: Side effects reviewed, pt to contact office should one occur. Birth Control Pills Pregnancy And Lactation Text: This medication should be avoided if pregnant and for the first 30 days post-partum. Topical Sulfur Applications Counseling: Topical Sulfur Counseling: Patient counseled that this medication may cause skin irritation or allergic reactions. In the event of skin irritation, the patient was advised to reduce the amount of the drug applied or use it less frequently. The patient verbalized understanding of the proper use and possible adverse effects of topical sulfur application. All of the patient's questions and concerns were addressed. Azithromycin Counseling:  I discussed with the patient the risks of azithromycin including but not limited to GI upset, allergic reaction, drug rash, diarrhea, and yeast infections. Topical Retinoid Pregnancy And Lactation Text: This medication is Pregnancy Category C. It is unknown if this medication is excreted in breast milk. Doxycycline Pregnancy And Lactation Text: This medication is Pregnancy Category D and not consider safe during pregnancy. It is also excreted in breast milk but is considered safe for shorter treatment courses. Sarecycline Counseling: Patient advised regarding possible photosensitivity and discoloration of the teeth, skin, lips, tongue and gums. Patient instructed to avoid sunlight, if possible. When exposed to sunlight, patients should wear protective clothing, sunglasses, and sunscreen. The patient was instructed to call the office immediately if the following severe adverse effects occur:  hearing changes, easy bruising/bleeding, severe headache, or vision changes. The patient verbalized understanding of the proper use and possible adverse effects of sarecycline. All of the patient's questions and concerns were addressed. High Dose Vitamin A Pregnancy And Lactation Text: High dose vitamin A therapy is contraindicated during pregnancy and breast feeding. Spironolactone Counseling: Patient advised regarding risks of diarrhea, abdominal pain, hyperkalemia, birth defects (for female patients), liver toxicity and renal toxicity. The patient may need blood work to monitor liver and kidney function and potassium levels while on therapy. The patient verbalized understanding of the proper use and possible adverse effects of spironolactone. All of the patient's questions and concerns were addressed. Benzoyl Peroxide Counseling: Patient counseled that medicine may cause skin irritation and bleach clothing. In the event of skin irritation, the patient was advised to reduce the amount of the drug applied or use it less frequently. The patient verbalized understanding of the proper use and possible adverse effects of benzoyl peroxide. All of the patient's questions and concerns were addressed. Dapsone Counseling: I discussed with the patient the risks of dapsone including but not limited to hemolytic anemia, agranulocytosis, rashes, methemoglobinemia, kidney failure, peripheral neuropathy, headaches, GI upset, and liver toxicity. Patients who start dapsone require monitoring including baseline LFTs and weekly CBCs for the first month, then every month thereafter. The patient verbalized understanding of the proper use and possible adverse effects of dapsone. All of the patient's questions and concerns were addressed. Tazorac Pregnancy And Lactation Text: This medication is not safe during pregnancy. It is unknown if this medication is excreted in breast milk. Azithromycin Pregnancy And Lactation Text: This medication is considered safe during pregnancy and is also secreted in breast milk. Erythromycin Counseling:  I discussed with the patient the risks of erythromycin including but not limited to GI upset, allergic reaction, drug rash, diarrhea, increase in liver enzymes, and yeast infections. Topical Sulfur Applications Pregnancy And Lactation Text: This medication is Pregnancy Category C and has an unknown safety profile during pregnancy. It is unknown if this topical medication is excreted in breast milk. Dapsone Pregnancy And Lactation Text: This medication is Pregnancy Category C and is not considered safe during pregnancy or breast feeding. Topical Clindamycin Counseling: Patient counseled that this medication may cause skin irritation or allergic reactions. In the event of skin irritation, the patient was advised to reduce the amount of the drug applied or use it less frequently. The patient verbalized understanding of the proper use and possible adverse effects of clindamycin. All of the patient's questions and concerns were addressed. Minocycline Counseling: Patient advised regarding possible photosensitivity and discoloration of the teeth, skin, lips, tongue and gums. Patient instructed to avoid sunlight, if possible. When exposed to sunlight, patients should wear protective clothing, sunglasses, and sunscreen. The patient was instructed to call the office immediately if the following severe adverse effects occur:  hearing changes, easy bruising/bleeding, severe headache, or vision changes. The patient verbalized understanding of the proper use and possible adverse effects of minocycline. All of the patient's questions and concerns were addressed. Bactrim Pregnancy And Lactation Text: This medication is Pregnancy Category D and is known to cause fetal risk. It is also excreted in breast milk. Isotretinoin Counseling: Patient should get monthly blood tests, not donate blood, not drive at night if vision affected, not share medication, and not undergo elective surgery for 6 months after tx completed. Side effects reviewed, pt to contact office should one occur. Spironolactone Pregnancy And Lactation Text: This medication can cause feminization of the male fetus and should be avoided during pregnancy. The active metabolite is also found in breast milk. Winlevi Counseling:  I discussed with the patient the risks of topical clascoterone including but not limited to erythema, scaling, itching, and stinging. Patient voiced their understanding. Bactrim Counseling:  I discussed with the patient the risks of sulfa antibiotics including but not limited to GI upset, allergic reaction, drug rash, diarrhea, dizziness, photosensitivity, and yeast infections. Rarely, more serious reactions can occur including but not limited to aplastic anemia, agranulocytosis, methemoglobinemia, blood dyscrasias, liver or kidney failure, lung infiltrates or desquamative/blistering drug rashes. Benzoyl Peroxide Pregnancy And Lactation Text: This medication is Pregnancy Category C. It is unknown if benzoyl peroxide is excreted in breast milk. Erythromycin Pregnancy And Lactation Text: This medication is Pregnancy Category B and is considered safe during pregnancy. It is also excreted in breast milk.

## 2024-03-16 NOTE — OB PROVIDER TRIAGE NOTE - ADDITIONAL INSTRUCTIONS
Pt. d/c'd home. Pt. to follow up with next OB appointment. Pt. instructed to return to triage with increase abdominal contractions, leakage of fluid, vaginal bleeding or decrease fetal movement.

## 2024-03-16 NOTE — OB PROVIDER TRIAGE NOTE - NSHPPHYSICALEXAM_GEN_ALL_CORE
ICU Vital Signs Last 24 Hrs  T(C): 36.9 (16 Mar 2024 22:33), Max: 36.9 (16 Mar 2024 21:47)  T(F): 98.4 (16 Mar 2024 22:33), Max: 98.42 (16 Mar 2024 21:47)  HR: 89 (16 Mar 2024 22:33) (86 - 89)  BP: 104/61 (16 Mar 2024 22:33) (102/60 - 104/61)  BP(mean): --  ABP: --  ABP(mean): --  RR: 16 (16 Mar 2024 22:33) (16 - 16)  SpO2: --    Abdomen soft nontender  TAS: Cephalic presentation, anterior placenta, ANA: 7.47, BPP: 8/8   Speculum: Negative pooling, nitrazine and fern   Category I/Reactive NST  Elzbieta irregularly ICU Vital Signs Last 24 Hrs  T(C): 36.9 (16 Mar 2024 22:33), Max: 36.9 (16 Mar 2024 21:47)  T(F): 98.4 (16 Mar 2024 22:33), Max: 98.42 (16 Mar 2024 21:47)  HR: 89 (16 Mar 2024 22:33) (86 - 89)  BP: 104/61 (16 Mar 2024 22:33) (102/60 - 104/61)  BP(mean): --  ABP: --  ABP(mean): --  RR: 16 (16 Mar 2024 22:33) (16 - 16)  SpO2: --    Abdomen soft nontender  TAS: Cephalic presentation, anterior placenta, ANA: 7.47, BPP: 8/8   Pt. and family member seen and felt movement on ultrasound   Speculum: Negative pooling, nitrazine and fern   Category I/Reactive NST  Elzbieta irregularly

## 2024-03-17 VITALS — HEART RATE: 83 BPM | SYSTOLIC BLOOD PRESSURE: 103 MMHG | DIASTOLIC BLOOD PRESSURE: 55 MMHG

## 2024-03-19 DIAGNOSIS — Z86.16 PERSONAL HISTORY OF COVID-19: ICD-10-CM

## 2024-03-19 DIAGNOSIS — Z3A.39 39 WEEKS GESTATION OF PREGNANCY: ICD-10-CM

## 2024-03-19 DIAGNOSIS — O36.8130 DECREASED FETAL MOVEMENTS, THIRD TRIMESTER, NOT APPLICABLE OR UNSPECIFIED: ICD-10-CM

## 2024-03-23 ENCOUNTER — INPATIENT (INPATIENT)
Facility: HOSPITAL | Age: 29
LOS: 1 days | Discharge: HOME | End: 2024-03-25
Attending: OBSTETRICS & GYNECOLOGY | Admitting: OBSTETRICS & GYNECOLOGY

## 2024-03-23 VITALS
RESPIRATION RATE: 19 BRPM | DIASTOLIC BLOOD PRESSURE: 63 MMHG | SYSTOLIC BLOOD PRESSURE: 111 MMHG | TEMPERATURE: 98 F | HEART RATE: 90 BPM

## 2024-03-23 DIAGNOSIS — O26.899 OTHER SPECIFIED PREGNANCY RELATED CONDITIONS, UNSPECIFIED TRIMESTER: ICD-10-CM

## 2024-03-23 DIAGNOSIS — O36.8190 DECREASED FETAL MOVEMENTS, UNSPECIFIED TRIMESTER, NOT APPLICABLE OR UNSPECIFIED: ICD-10-CM

## 2024-03-23 DIAGNOSIS — Z90.89 ACQUIRED ABSENCE OF OTHER ORGANS: Chronic | ICD-10-CM

## 2024-03-23 LAB
BASOPHILS # BLD AUTO: 0.07 K/UL — SIGNIFICANT CHANGE UP (ref 0–0.2)
BASOPHILS NFR BLD AUTO: 0.5 % — SIGNIFICANT CHANGE UP (ref 0–2)
EOSINOPHIL # BLD AUTO: 0.21 K/UL — SIGNIFICANT CHANGE UP (ref 0–0.5)
EOSINOPHIL NFR BLD AUTO: 1.5 % — SIGNIFICANT CHANGE UP (ref 0–6)
HCT VFR BLD CALC: 41.4 % — SIGNIFICANT CHANGE UP (ref 34.5–45)
HGB BLD-MCNC: 14.3 G/DL — SIGNIFICANT CHANGE UP (ref 11.5–15.5)
IANC: 9.89 K/UL — HIGH (ref 1.8–7.4)
IMM GRANULOCYTES NFR BLD AUTO: 1.2 % — HIGH (ref 0–0.9)
LYMPHOCYTES # BLD AUTO: 17.7 % — SIGNIFICANT CHANGE UP (ref 13–44)
LYMPHOCYTES # BLD AUTO: 2.49 K/UL — SIGNIFICANT CHANGE UP (ref 1–3.3)
MCHC RBC-ENTMCNC: 31.1 PG — SIGNIFICANT CHANGE UP (ref 27–34)
MCHC RBC-ENTMCNC: 34.5 GM/DL — SIGNIFICANT CHANGE UP (ref 32–36)
MCV RBC AUTO: 90 FL — SIGNIFICANT CHANGE UP (ref 80–100)
MONOCYTES # BLD AUTO: 1.21 K/UL — HIGH (ref 0–0.9)
MONOCYTES NFR BLD AUTO: 8.6 % — SIGNIFICANT CHANGE UP (ref 2–14)
NEUTROPHILS # BLD AUTO: 9.89 K/UL — HIGH (ref 1.8–7.4)
NEUTROPHILS NFR BLD AUTO: 70.5 % — SIGNIFICANT CHANGE UP (ref 43–77)
NRBC # BLD: 0 /100 WBCS — SIGNIFICANT CHANGE UP (ref 0–0)
NRBC # FLD: 0 K/UL — SIGNIFICANT CHANGE UP (ref 0–0)
PLATELET # BLD AUTO: 259 K/UL — SIGNIFICANT CHANGE UP (ref 150–400)
RBC # BLD: 4.6 M/UL — SIGNIFICANT CHANGE UP (ref 3.8–5.2)
RBC # FLD: 13.1 % — SIGNIFICANT CHANGE UP (ref 10.3–14.5)
WBC # BLD: 14.04 K/UL — HIGH (ref 3.8–10.5)
WBC # FLD AUTO: 14.04 K/UL — HIGH (ref 3.8–10.5)

## 2024-03-23 RX ORDER — CHLORHEXIDINE GLUCONATE 213 G/1000ML
1 SOLUTION TOPICAL DAILY
Refills: 0 | Status: DISCONTINUED | OUTPATIENT
Start: 2024-03-23 | End: 2024-03-24

## 2024-03-23 RX ORDER — SODIUM CHLORIDE 9 MG/ML
1000 INJECTION, SOLUTION INTRAVENOUS
Refills: 0 | Status: DISCONTINUED | OUTPATIENT
Start: 2024-03-23 | End: 2024-03-24

## 2024-03-23 RX ORDER — OXYTOCIN 10 UNIT/ML
333.33 VIAL (ML) INJECTION
Qty: 20 | Refills: 0 | Status: DISCONTINUED | OUTPATIENT
Start: 2024-03-23 | End: 2024-03-24

## 2024-03-23 NOTE — OB PROVIDER TRIAGE NOTE - HISTORY OF PRESENT ILLNESS
28 year old female P1 at 40.1 week gestation who presents with stronger contractions q 3-5 minutes and noted Decreased FM today   decrease in strength and frequency and still with decreased FM in triage    denied any LOF VB        PNC  WFW  Dr Hale    GBS negative   EFW  6#

## 2024-03-23 NOTE — OB RN PATIENT PROFILE - INFANT HOME WITH MOTHER, OB PROFILE
82 Ventricular Rate BPM  82 Atrial Rate BPM  154 P-R Interval ms  86 QRS Duration ms  374 Q-T Interval ms  436 QTC Calculation(Bezet) ms  19 P Axis degrees  72 R Axis degrees  92 T Axis degrees  Normal sinus rhythm  Nonspecific ST and T wave abnormality  Abnormal ECG  Confirmed by Shlomo CRAFT, [facility] ([id number]) on 6/19/2019 5:21:32 PM   yes

## 2024-03-23 NOTE — OB PROVIDER TRIAGE NOTE - NSOBPROVIDERNOTE_OBGYN_ALL_OB_FT
28 year old female P1 at 40.1 weeks  Decreased FM  / latent labor   for IOL with buccal Cytotec /CB     case d/w Dr Rosen    admission orders   consents obtained   plan of care d/w patient      JOHN CHAIREZ

## 2024-03-23 NOTE — OB PROVIDER TRIAGE NOTE - NSHPPHYSICALEXAM_GEN_ALL_CORE
pt seen and examined   ICU Vital Signs Last 24 Hrs  T(C): 36.9 (23 Mar 2024 22:27), Max: 36.9 (23 Mar 2024 22:27)  T(F): 98.4 (23 Mar 2024 22:27), Max: 98.4 (23 Mar 2024 22:27)  HR: 83 (23 Mar 2024 23:18) (83 - 93)  BP: 107/63 (23 Mar 2024 23:18) (105/70 - 111/63)  BP(mean): --  ABP: --  ABP(mean): --  RR: 19 (23 Mar 2024 22:27) (19 - 19)  SpO2: --    pt alert OX3   lungs clear   heart s1 s2   abd soft gravid  non tender    VE  2/50/-3  vertex    placed on EFM    NST  reactive  with contraction q 3-6 min   scan  3033vertex ANA 9 BPP 8/8

## 2024-03-23 NOTE — OB PROVIDER H&P - NSHPPHYSICALEXAM_GEN_ALL_CORE
pt seen and examined   ICU Vital Signs Last 24 Hrs  T(C): 36.9 (23 Mar 2024 22:27), Max: 36.9 (23 Mar 2024 22:27)  T(F): 98.4 (23 Mar 2024 22:27), Max: 98.4 (23 Mar 2024 22:27)  HR: 83 (23 Mar 2024 23:18) (83 - 93)  BP: 107/63 (23 Mar 2024 23:18) (105/70 - 111/63)  BP(mean): --  ABP: --  ABP(mean): --  RR: 19 (23 Mar 2024 22:27) (19 - 19)  SpO2: --    pt alert OX3   lungs clear   heart s1 s2   abd soft gravid  non tender    VE  2/50/-3  vertex    placed on EFM    NST  reactive  with contraction q 3-6 min   scan  3033vertex ANA 9 BPP 8/8   images saved to asob

## 2024-03-23 NOTE — OB RN PATIENT PROFILE - NS_MEANSOFARRIVAL_OBGYN_ALL_OB
What Type Of Note Output Would You Prefer (Optional)?: Standard Output
What Is The Reason For Today's Visit?: Full Body Skin Examination
What Is The Reason For Today's Visit? (Being Monitored For X): concerning skin lesions on an annual basis
How Severe Are Your Spot(S)?: moderate
Ambulatory

## 2024-03-23 NOTE — OB RN PATIENT PROFILE - NS_PRENATALHARD_OBGYN_ALL_OB
· Acute on chronic  · Presented with weakness and sodium of 116  · Status post 1 8% NS as per nephrology  · Continue BMP q6 as per nephrologist   · As per nephrologist, they would like to be informed if the serum sodium is greater than 130 or less than 126  · TSH normal   Serum osmolality 259, urine osmolality 395, urine sodium 83, uric acid 5 8, cortisol level 14  6  · Chest x-ray:  Cardiomegaly have; CT scan of the head:  Negative for pathology  · Exact etiology, not entirely clear  Appears with underlying chronicity, with previous sodium levels in the low 130s  · Nephrologist started in the small dose of loop diuretic (torsemide) and low-dose salt tablets  Chlorthalidone will be permanently discontinued  Available

## 2024-03-23 NOTE — OB PROVIDER IHI INDUCTION/AUGMENTATION NOTE - NS_STATION_OBGYN_ALL_OB_NU
Oculoplastic Surgeon Procedure Text (A): After obtaining clear surgical margins the patient was sent to oculoplastics for surgical repair.  The patient understands they will receive post-surgical care and follow-up from the referring physician's office. -3

## 2024-03-23 NOTE — OB RN PATIENT PROFILE - FALL HARM RISK - UNIVERSAL INTERVENTIONS
no back pain, no gout, no musculoskeletal pain, no neck pain, and no weakness. Bed in lowest position, wheels locked, appropriate side rails in place/Call bell, personal items and telephone in reach/Instruct patient to call for assistance before getting out of bed or chair/Non-slip footwear when patient is out of bed/[place] to call system/Physically safe environment - no spills, clutter or unnecessary equipment/Purposeful Proactive Rounding/Room/bathroom lighting operational, light cord in reach

## 2024-03-23 NOTE — OB RN PATIENT PROFILE - PRO BLOOD TYPE INFANT
Virtual Regular Visit      Assessment/Plan:    Problem List Items Addressed This Visit        Cardiovascular and Mediastinum    Benign essential hypertension - Primary     -goal bp <140/90, pt's bp 131/81 at goal  -currently on losartan and hctz  -Advised patient on symptoms of hypotension & severe HTN  -Limit salt-intake & caffeine in diet, minimize stress level  -patient currently on of well balanced diet         Relevant Medications    losartan (COZAAR) 100 MG tablet       Nervous and Auditory    Tonic-clonic epileptic seizures ([address] )     Stable, seizure-free since 01/17/2012  Controlled on phenobarbital  Follows neurology outpatient            Musculoskeletal and Integument    Osteoporosis     Stable  Status post Prolia injection 06/04/2020  Scheduled for DEXA scan on 10/06/2020 to see effect of Prolia treatment  Will continue to monitor            Other    Hyperlipidemia     Last lipid panel February 2020  Currently on simvastatin 20 mg  Will continue current treatment         Insomnia     Controlled with Ambien 2 5 mg HS                    Reason for visit is   Chief Complaint   Patient presents with    Virtual Regular Visit        Encounter provider Keara Chao MD    Provider located at [address] [address] [phone]      Recent Visits  No visits were found meeting these conditions  Showing recent visits within past 7 days and meeting all other requirements     Today's Visits  Date Type Provider Dept   09/29/20 Telemedicine Keara Chao MD [facility] today's visits and meeting all other requirements     Future Appointments  No visits were found meeting these conditions  Showing future appointments within next 150 days and meeting all other requirements        The patient was identified by name and date of birth   Savannah Serrano was informed that this is a telemedicine visit and that the visit is being conducted through [employer]  My office door was closed  No one else was in the room  She acknowledged consent and understanding of privacy and security of the video platform  The patient has agreed to participate and understands they can discontinue the visit at any time  Patient is aware this is a billable service  Subjective  Alexei Starr is a 63 y o  female presents via [employer] for virtual visit    HPI     76-year-old female patient from [facility] presenting via [employer] team for follow-up for chronic medical conditions  Patient was last seen on 07/09/2020  Patient's caretaker denies any acute concerns at this time  No recent change in overall health, patient continues to have a good appetite and is eating well  No recent change in weight down from 167 to 166 lb  Patient's blood pressure has been well controlled systolic blood pressure ranging from 110s to 130s  Patient denies any issue with bowel movements or urination  Patient has issue with insomnia in the past but this is controlled with 2 5 mg of Ambien HS  Of note, patient has DEXA scan 10/6/20  Last Prolia injection June of 2020  Scheduled to have flu shot in the next 2 weeks        Past Medical History:   Diagnosis Date    Bowel incontinence     Constipation     Last Assessed: [id number]    Generalized convulsive seizure ([address] )     Hyperlipidemia     Osteoporosis     Spastic quadriplegic cerebral palsy (HCC)     Urinary incontinence     Vitamin D deficiency     Last Assessed: [id number]       Past Surgical History:   Procedure Laterality Date    ABSCESS DRAINAGE      Incision and Drainage of skin abscess back    COLONOSCOPY      Fiberoptic; Last Assessed: [id number]    EYE SURGERY Left     Strabismus Left Eye       Current Outpatient Medications   Medication Sig Dispense Refill    acetaminophen (TYLENOL 8 HOUR) 650 mg CR tablet Take 1 tablet by mouth every 6 hours as needed for mild pain or temp greater than 100 4 30 tablet 0    baclofen 10 mg tablet Take 1 tablet (10 mg total) by mouth 2 (two) times a day 60 tablet 6    benzonatate (TESSALON PERLES) 100 mg capsule Take 1 capsule by mouth every 8 hours as needed for dry cough 20 capsule 0    Calcium Citrate 200 MG TABS Take by mouth 3 TABS TWICE A DAY       carbamide peroxide (DEBROX) 6 5 % otic solution Instill 5 drops in affected ear twice daily for 4 days as needed for ear wax 15 mL 0    cholecalciferol (VITAMIN D3) 1,000 units tablet Take 1 capsule by mouth daily      Cholecalciferol (Vitamin D3) 25 MCG (1000 UT) CAPS       clotrimazole-betamethasone (LOTRISONE) 1-0 05 % cream Apply twice daily as needed for skin irritation from diaper 30 g 0    denosumab (PROLIA) 60 mg/mL Inject 1 mL (60 mg total) under the skin once for 1 dose 1 mL 0    famotidine (PEPCID) 20 mg tablet Take 1 tablet (20 mg total) by mouth daily at bedtime  0    fluticasone (FLONASE) 50 mcg/act nasal spray USE 1 SPRAY IN EACH NOSTRIL TWICE A DAY (RHINITIS) 16 g 10    guaiFENesin (MUCINEX) 600 mg 12 hr tablet Take 1 tablet by mouth every 12 (twelve) hours as needed      hydrochlorothiazide (HYDRODIURIL) 25 mg tablet Take 0 5 tablets (12 5 mg total) by mouth daily 30 tablet 5    ibuprofen (MOTRIN) 600 mg tablet Take by mouth every 6 (six) hours      ipratropium (ATROVENT) 0 06 % nasal spray 2 sprays into each nostril 3 (three) times a day as needed for rhinitis 15 mL 0    loratadine (CLARITIN) 10 mg tablet TAKE 1 TABLET BY MOUTH DAILY (ALLERGIC RHINITIS) 28 tablet 10    LORazepam (ATIVAN) 0 5 mg tablet Take 1 tablet (0 5 mg total) by mouth once for 1 dose May repeat in 30 min   2 tablet 0    losartan (COZAAR) 100 MG tablet       losartan (COZAAR) 50 mg tablet Take 1 tablet (50 mg total) by mouth daily 30 tablet 5    metoprolol succinate (TOPROL-XL) 100 mg 24 hr tablet Take 1 tablet by mouth daily  0    olopatadine HCl (PATADAY) 0 2 % opth drops Instill 1 drop into affected eye(s) daily PRN irritation  0    PHENobarbital 32 4 mg tablet Take 3 tablets by mouth at 4 PM daily 90 tablet 5    polyethylene glycol (MIRALAX) powder Take by mouth      Polyethylene Glycol 3350 (MIRALAX PO) Take 1 packet by mouth daily      povidone-iodine (BETADINE) 10 % ointment Apply topically 2 (two) times a day as needed      simvastatin (ZOCOR) 20 mg tablet Take 1 tablet by mouth daily  0    Vitamins A & D (VITAMIN A & D) ointment Apply topically      zolpidem (AMBIEN) 5 mg tablet Take 0 5 tablets (2 5 mg total) by mouth daily 30 tablet 5     No current facility-administered medications for this visit  Allergies   Allergen Reactions    Other      Seasonal Allergies       Review of Systems  As noted in HPI    Video Exam    Vitals:    09/29/20 1524   BP: 131/81   Pulse: 72   Resp: 18   Temp: 98 2 °F (36 8 °C)   SpO2: 98%   Weight: 75 4 kg (166 lb 3 2 oz)     Physical Exam  Constitutional:       General: She is not in acute distress  Appearance: Normal appearance  Neurological:      Mental Status: She is alert  Psychiatric:         Mood and Affect: Mood normal          Completed by caretaker    GOMEZ Rodriguez    Family Medicine, PGY-3 O positive

## 2024-03-23 NOTE — OB RN TRIAGE NOTE - FALL HARM RISK - UNIVERSAL INTERVENTIONS
Bed in lowest position, wheels locked, appropriate side rails in place/Call bell, personal items and telephone in reach/Instruct patient to call for assistance before getting out of bed or chair/Non-slip footwear when patient is out of bed/[place] to call system/Physically safe environment - no spills, clutter or unnecessary equipment/Purposeful Proactive Rounding/Room/bathroom lighting operational, light cord in reach

## 2024-03-23 NOTE — OB PROVIDER IHI INDUCTION/AUGMENTATION NOTE - NS_GESTAGE_OBGYN_ALL_OB_FT
Left voice message for patient to return call to schedule appointment from referral to Sleep Medicine.  Stefania SIMS [phone]    
40w1d

## 2024-03-24 ENCOUNTER — TRANSCRIPTION ENCOUNTER (OUTPATIENT)
Age: 29
End: 2024-03-24

## 2024-03-24 RX ORDER — KETOROLAC TROMETHAMINE 30 MG/ML
30 SYRINGE (ML) INJECTION ONCE
Refills: 0 | Status: DISCONTINUED | OUTPATIENT
Start: 2024-03-24 | End: 2024-03-24

## 2024-03-24 RX ORDER — OXYTOCIN 10 UNIT/ML
41.67 VIAL (ML) INJECTION
Qty: 20 | Refills: 0 | Status: DISCONTINUED | OUTPATIENT
Start: 2024-03-24 | End: 2024-03-25

## 2024-03-24 RX ORDER — ACETAMINOPHEN 500 MG
3 TABLET ORAL
Qty: 0 | Refills: 0 | DISCHARGE
Start: 2024-03-24

## 2024-03-24 RX ORDER — SODIUM CHLORIDE 9 MG/ML
3 INJECTION INTRAMUSCULAR; INTRAVENOUS; SUBCUTANEOUS EVERY 8 HOURS
Refills: 0 | Status: DISCONTINUED | OUTPATIENT
Start: 2024-03-24 | End: 2024-03-25

## 2024-03-24 RX ORDER — SODIUM CHLORIDE 9 MG/ML
500 INJECTION, SOLUTION INTRAVENOUS ONCE
Refills: 0 | Status: DISCONTINUED | OUTPATIENT
Start: 2024-03-24 | End: 2024-03-24

## 2024-03-24 RX ORDER — HYDROCORTISONE 1 %
1 OINTMENT (GRAM) TOPICAL EVERY 6 HOURS
Refills: 0 | Status: DISCONTINUED | OUTPATIENT
Start: 2024-03-24 | End: 2024-03-25

## 2024-03-24 RX ORDER — ONDANSETRON 8 MG/1
4 TABLET, FILM COATED ORAL ONCE
Refills: 0 | Status: COMPLETED | OUTPATIENT
Start: 2024-03-24 | End: 2024-03-24

## 2024-03-24 RX ORDER — IBUPROFEN 200 MG
600 TABLET ORAL EVERY 6 HOURS
Refills: 0 | Status: COMPLETED | OUTPATIENT
Start: 2024-03-24 | End: 2025-02-20

## 2024-03-24 RX ORDER — SODIUM CHLORIDE 9 MG/ML
1000 INJECTION, SOLUTION INTRAVENOUS ONCE
Refills: 0 | Status: DISCONTINUED | OUTPATIENT
Start: 2024-03-24 | End: 2024-03-24

## 2024-03-24 RX ORDER — BENZOCAINE 10 %
1 GEL (GRAM) MUCOUS MEMBRANE EVERY 6 HOURS
Refills: 0 | Status: DISCONTINUED | OUTPATIENT
Start: 2024-03-24 | End: 2024-03-25

## 2024-03-24 RX ORDER — AER TRAVELER 0.5 G/1
1 SOLUTION RECTAL; TOPICAL EVERY 4 HOURS
Refills: 0 | Status: DISCONTINUED | OUTPATIENT
Start: 2024-03-24 | End: 2024-03-25

## 2024-03-24 RX ORDER — SIMETHICONE 80 MG/1
80 TABLET, CHEWABLE ORAL EVERY 4 HOURS
Refills: 0 | Status: DISCONTINUED | OUTPATIENT
Start: 2024-03-24 | End: 2024-03-25

## 2024-03-24 RX ORDER — OXYCODONE HYDROCHLORIDE 5 MG/1
5 TABLET ORAL
Refills: 0 | Status: DISCONTINUED | OUTPATIENT
Start: 2024-03-24 | End: 2024-03-25

## 2024-03-24 RX ORDER — TETANUS TOXOID, REDUCED DIPHTHERIA TOXOID AND ACELLULAR PERTUSSIS VACCINE, ADSORBED 5; 2.5; 8; 8; 2.5 [IU]/.5ML; [IU]/.5ML; UG/.5ML; UG/.5ML; UG/.5ML
0.5 SUSPENSION INTRAMUSCULAR ONCE
Refills: 0 | Status: DISCONTINUED | OUTPATIENT
Start: 2024-03-24 | End: 2024-03-25

## 2024-03-24 RX ORDER — LANOLIN
1 OINTMENT (GRAM) TOPICAL EVERY 6 HOURS
Refills: 0 | Status: DISCONTINUED | OUTPATIENT
Start: 2024-03-24 | End: 2024-03-25

## 2024-03-24 RX ORDER — IBUPROFEN 200 MG
1 TABLET ORAL
Qty: 0 | Refills: 0 | DISCHARGE
Start: 2024-03-24

## 2024-03-24 RX ORDER — IBUPROFEN 200 MG
600 TABLET ORAL EVERY 6 HOURS
Refills: 0 | Status: DISCONTINUED | OUTPATIENT
Start: 2024-03-24 | End: 2024-03-25

## 2024-03-24 RX ORDER — OXYCODONE HYDROCHLORIDE 5 MG/1
5 TABLET ORAL ONCE
Refills: 0 | Status: DISCONTINUED | OUTPATIENT
Start: 2024-03-24 | End: 2024-03-25

## 2024-03-24 RX ORDER — OXYTOCIN 10 UNIT/ML
VIAL (ML) INJECTION
Qty: 30 | Refills: 0 | Status: DISCONTINUED | OUTPATIENT
Start: 2024-03-24 | End: 2024-03-24

## 2024-03-24 RX ORDER — ACETAMINOPHEN 500 MG
975 TABLET ORAL
Refills: 0 | Status: DISCONTINUED | OUTPATIENT
Start: 2024-03-24 | End: 2024-03-25

## 2024-03-24 RX ORDER — PRAMOXINE HYDROCHLORIDE 150 MG/15G
1 AEROSOL, FOAM RECTAL EVERY 4 HOURS
Refills: 0 | Status: DISCONTINUED | OUTPATIENT
Start: 2024-03-24 | End: 2024-03-25

## 2024-03-24 RX ORDER — MAGNESIUM HYDROXIDE 400 MG/1
30 TABLET, CHEWABLE ORAL
Refills: 0 | Status: DISCONTINUED | OUTPATIENT
Start: 2024-03-24 | End: 2024-03-25

## 2024-03-24 RX ORDER — DIBUCAINE 1 %
1 OINTMENT (GRAM) RECTAL EVERY 6 HOURS
Refills: 0 | Status: DISCONTINUED | OUTPATIENT
Start: 2024-03-24 | End: 2024-03-25

## 2024-03-24 RX ORDER — DIPHENHYDRAMINE HCL 50 MG
25 CAPSULE ORAL EVERY 6 HOURS
Refills: 0 | Status: DISCONTINUED | OUTPATIENT
Start: 2024-03-24 | End: 2024-03-25

## 2024-03-24 RX ADMIN — Medication 600 MILLIGRAM(S): at 23:06

## 2024-03-24 RX ADMIN — Medication 600 MILLIGRAM(S): at 17:19

## 2024-03-24 RX ADMIN — SODIUM CHLORIDE 3 MILLILITER(S): 9 INJECTION INTRAMUSCULAR; INTRAVENOUS; SUBCUTANEOUS at 22:00

## 2024-03-24 RX ADMIN — Medication 1 TABLET(S): at 12:55

## 2024-03-24 RX ADMIN — Medication 600 MILLIGRAM(S): at 22:36

## 2024-03-24 RX ADMIN — Medication 2 MILLIUNIT(S)/MIN: at 04:36

## 2024-03-24 RX ADMIN — Medication 975 MILLIGRAM(S): at 14:01

## 2024-03-24 RX ADMIN — SODIUM CHLORIDE 125 MILLILITER(S): 9 INJECTION, SOLUTION INTRAVENOUS at 00:35

## 2024-03-24 RX ADMIN — Medication 975 MILLIGRAM(S): at 18:41

## 2024-03-24 RX ADMIN — SODIUM CHLORIDE 3 MILLILITER(S): 9 INJECTION INTRAMUSCULAR; INTRAVENOUS; SUBCUTANEOUS at 14:01

## 2024-03-24 RX ADMIN — Medication 125 MILLIUNIT(S)/MIN: at 06:20

## 2024-03-24 RX ADMIN — CHLORHEXIDINE GLUCONATE 1 APPLICATION(S): 213 SOLUTION TOPICAL at 00:36

## 2024-03-24 RX ADMIN — ONDANSETRON 4 MILLIGRAM(S): 8 TABLET, FILM COATED ORAL at 00:59

## 2024-03-24 RX ADMIN — Medication 975 MILLIGRAM(S): at 13:01

## 2024-03-24 RX ADMIN — Medication 600 MILLIGRAM(S): at 16:19

## 2024-03-24 NOTE — OB PROVIDER DELIVERY SUMMARY - NSSELHIDDEN_OBGYN_ALL_OB_FT
[NS_DeliveryAttending1_OBGYN_ALL_OB_FT:[id number]==],[NS_DeliveryRN_OBGYN_ALL_OB_FT:[id number]=]

## 2024-03-24 NOTE — DISCHARGE NOTE OB - PATIENT PORTAL LINK FT
You can access the FollowMyHealth Patient Portal offered by [facility] by registering at the following website: [email or web address]. By joining [employer]’s FollowMyHealth portal, you will also be able to view your health information using other applications (apps) compatible with our system.

## 2024-03-24 NOTE — OB RN DELIVERY SUMMARY - NS_SEPSISRSKCALC_OBGYN_ALL_OB_FT
EOS calculated successfully. EOS Risk Factor: 0.5/1000 live births ([facility][note 3] national incidence); GA=40w2d; Temp=98.4; ROM=0.683; GBS='Negative'; Antibiotics='No antibiotics or any antibiotics < 2 hrs prior to birth'

## 2024-03-24 NOTE — OB PROVIDER LABOR PROGRESS NOTE - ASSESSMENT
IOL for DFM    -Labor: CB placed without issue, for BC  -Fetus: cat 1  -GBS: neg  -Pain: epidural in place, effective    Plan per Dr. Silas Ponce PGY2
28y  at 40w2 IOL for dFM     - for pitocin  - s/pCB/BC  - continue EFM/toco  - Anticipate      D/w Dr. Silas Eason, PGY1

## 2024-03-24 NOTE — DISCHARGE NOTE OB - CARE PROVIDER_API CALL
Sarah Grigsby  Obstetrics and Gynecology  [address] [facility] [address], [address]  [place] [address]  Phone: [phone]  Fax: [phone]  Follow Up Time:

## 2024-03-24 NOTE — OB RN DELIVERY SUMMARY - NSSELHIDDEN_OBGYN_ALL_OB_FT
[NS_DeliveryAttending1_OBGYN_ALL_OB_FT:[id number]==],[NS_DeliveryRN_OBGYN_ALL_OB_FT:[id number]=]

## 2024-03-24 NOTE — DISCHARGE NOTE OB - MEDICATION SUMMARY - MEDICATIONS TO TAKE
I will START or STAY ON the medications listed below when I get home from the hospital:    ibuprofen 600 mg oral tablet  -- 1 tab(s) by mouth every 6 hours  -- Indication: For pain    acetaminophen 325 mg oral tablet  -- 3 tab(s) by mouth every 6 hours  -- Indication: For pain    PNV Prenatal oral tablet  -- 1 tab(s) by mouth once a day  -- Indication: For supplement

## 2024-03-24 NOTE — OB RN DELIVERY SUMMARY - AS DELIV COMPLICATIONS OB
abnormal fetal heart rate tracing/other abnormal fetal heart rate tracing/malpresentation/meconium stained fluid

## 2024-03-24 NOTE — DISCHARGE NOTE OB - NS MD DC FALL RISK RISK
For information on Fall & Injury Prevention, visit: [email or web address] OR  [email or web address] OR  https://www.cdc.gov/steadi/patient.html

## 2024-03-24 NOTE — OB PROVIDER LABOR PROGRESS NOTE - NS_SUBJECTIVE/OBJECTIVE_OBGYN_ALL_OB_FT
Patient seen at bedside for VE
Pt seen and examined at bedside for CB placement. Risks, indications and benefits of CB explained, pt amenable.    Vital Signs Last 24 Hrs  T(C): 36.5 (23 Mar 2024 23:33), Max: 36.9 (23 Mar 2024 22:27)  T(F): 97.7 (23 Mar 2024 23:33), Max: 98.4 (23 Mar 2024 22:27)  HR: 79 (24 Mar 2024 01:02) (74 - 101)  BP: 102/68 (24 Mar 2024 00:54) (92/59 - 111/63)  BP(mean): --  RR: 15 (23 Mar 2024 23:33) (15 - 19)  SpO2: 97% (24 Mar 2024 01:02) (94% - 97%)    Parameters below as of 23 Mar 2024 23:33  Patient On (Oxygen Delivery Method): room air

## 2024-03-24 NOTE — OB NEONATOLOGY/PEDIATRICIAN DELIVERY SUMMARY - NSNURSERYA_OBGYN_ALL_OB
C/o abd pain for three weeks had endoscopy scope. Md said results just showed bacteria. Md wanted to wait a week before a fibro scan but pt couldn't tolerate pain tonight. Denies nausea.   
[facility]

## 2024-03-24 NOTE — OB NEONATOLOGY/PEDIATRICIAN DELIVERY SUMMARY - NSPEDSNEONOTESA_OBGYN_ALL_OB_FT
Pediatrician called to delivery for cat II tracings and meconium stained fluids. Female infant born at 40+2/7 wks via  to a 30 y/o , blood type O+ mother. Maternal history of  '. No significant prenatal history. Prenatal labs nr/immune/-, GBS - on . SROM at 0506 on 3/24 with light meconium fluids. EOS score 0.05, highest maternal temperature 36.8. Baby emerged vigorous, crying. Infant was brought to radiant warmer and warmed, dried, stimulated and deep suctioned. HR>100, normal respiratory effort. APGARS of 8/9. Mom is initiating breast feeding. Consents to Hepatitis B vaccination. Pediatrician is Dr. Currie.    BW: 3140g (AGA)  : 3/24/24  TOB: 0547    Physical Exam:  Gen: no acute distress, +grimace  HEENT:  anterior fontanel open soft and flat, + cranial molding, nondysmorphic facies, no cleft lip/palate, ears normal set, no ear pits or tags, nares clinically patent  Resp: Normal respiratory effort without grunting or retractions, good air entry b/l, clear to auscultation bilaterally  Cardio: Present S1/S2, regular rate and rhythm  Abd: soft, non tender, non distended, umbilical cord with 3 vessels  Neuro: +palmar and plantar grasp, +suck, +dandre, normal tone  Extremities: negative metz and ortolani maneuvers, moving all extremities, no clavicular crepitus or stepoff  Skin: pink, warm  Genitals: Normal female anatomy, Jose 1, anus patent

## 2024-03-24 NOTE — OB PROVIDER DELIVERY SUMMARY - NSPROVIDERDELIVERYNOTE_OBGYN_ALL_OB_FT
Pt fully dilated and pushing.  Delivered viable infant over intact perineum, compound presentation with left arm.  Spontaneous cry.  Nose and mouth bulb suctioned.  Infant handed to mother.  Cord clamped and cut after delay.  Infant handed to awaiting pediatricians.  Cord gases sent.  Placenta delivered spontaneously and intact.  Right periurethral laceration repaired with 3-0 vicryl with excellent hemostasis and restoration of anatomy.  Fundus firm.  Vault empty.

## 2024-03-24 NOTE — OB PROVIDER DELIVERY SUMMARY - NS_PLACENTA_OBGYN_ALL_OB_DT
Medical Record reviewed.  Heather Serna was discharged from [facility] on 11/13/19.  30 day end date: 12/13/19  Pneumonia   During hospital stay, Heather Serna was identified as high risk for readmission due to readmission risk score/ diagnosis.  Contacted patient regarding recent hospitalization.    DC Disposition: Home or Self Care  Since discharge, patient is feeling good   Support at home: Yes,    The patient is having pain at this time. Pain at biopsy sites.  Rating her pain a 1-3 on the pain scale and using Ibuprofen as needed for management.  States it is tolerable.   Activity level:  Patient is ambulating without difficulty and able to complete daily tasks without SOB.  All prescriptions were filled at discharge (Augmentin)  The patient is taking all medications as prescribed.  AVS medications and instructions were reviewed with the patient.  The patient did not have questions or concerns regarding the medications prescribed.  She denies fevers or chills. States he breathing is the \"same\" and has \"very little\" productive cough with yellow phlegm.  She states her phlegm has been yellow the whole time.   The patient's appetite is \"not much\".  She has had a decreased appetite for about 10 months.  She is drinking protein milkshakes as instructed to help gain weight.  Diagnosis specific education provided: Yes, COPD booklet provided and explained in the hospital.  Encouraged Heather to call MD with any symptoms in yellow on COPD magnet.    Patient is reminded that next appointment is scheduled for 11/21/19 with Anel Norris NP and Dr. Cote on 11/25/19.  Patient verbalized understanding of appointment date/time and will be able to attend the appointment.  How will the patient get to the appointment? patient will drive.  Patient was invited to call back with any non-emergency questions or concerns.  For emergent needs, please call clinic phone number.  There were no further questions or concerns at this  time.  Please call if you have any questions.    Stephen Adan, RN, BSN  Transitional Care RN  [phone]     24-Mar-2024 05:51

## 2024-03-25 VITALS
SYSTOLIC BLOOD PRESSURE: 100 MMHG | OXYGEN SATURATION: 98 % | TEMPERATURE: 98 F | DIASTOLIC BLOOD PRESSURE: 56 MMHG | RESPIRATION RATE: 18 BRPM | HEART RATE: 82 BPM

## 2024-03-25 RX ADMIN — Medication 600 MILLIGRAM(S): at 12:13

## 2024-03-25 RX ADMIN — Medication 975 MILLIGRAM(S): at 10:36

## 2024-03-25 RX ADMIN — Medication 975 MILLIGRAM(S): at 10:06

## 2024-03-25 RX ADMIN — SODIUM CHLORIDE 3 MILLILITER(S): 9 INJECTION INTRAMUSCULAR; INTRAVENOUS; SUBCUTANEOUS at 06:15

## 2024-03-25 RX ADMIN — Medication 600 MILLIGRAM(S): at 12:45

## 2024-03-25 RX ADMIN — Medication 975 MILLIGRAM(S): at 04:30

## 2024-03-25 RX ADMIN — Medication 1 TABLET(S): at 12:13

## 2024-03-25 RX ADMIN — Medication 975 MILLIGRAM(S): at 03:56

## 2024-03-25 NOTE — PROGRESS NOTE ADULT - ASSESSMENT
A/P: 29yo PPD#1 s/p .  Patient is stable and doing well post-partum.   - Pain well controlled, continue current pain regimen  - Increase ambulation, SCDs when not ambulating  - Continue regular diet  -Discharge planning    E Erma LY

## 2024-03-25 NOTE — PROGRESS NOTE ADULT - SUBJECTIVE AND OBJECTIVE BOX
OB Attending Progress Note:  PPD#1    S: 29yo  PPD#1 s/p . Patient feels well. Pain is well controlled. She is tolerating a regular diet and passing flatus. She is voiding spontaneously, and ambulating without difficulty. Denies CP/SOB. Denies lightheadedness/dizziness. Denies N/V.    O:  Vitals:  Vital Signs Last 24 Hrs  T(C): 36.7 (25 Mar 2024 05:39), Max: 36.8 (24 Mar 2024 14:00)  HR: 80 (25 Mar 2024 05:39) (77 - 83)  BP: 99/68 (25 Mar 2024 05:39) (99/59 - 110/56)  RR: 18 (25 Mar 2024 05:39) (18 - 18)  SpO2: 99% (25 Mar 2024 05:39) (97% - 99%)      Labs:  Blood type: O Positive  Rubella IgG: RPR:                           14.3   14.04<H> >-----------< 259    (  @ 23:30 )             41.4                  Physical Exam:  General: NAD  Abdomen: soft, non-tender, non-distended, fundus firm  Vaginal: Lochia wnl  Extremities: No erythema/edema, no calf tenderness b/l

## 2024-03-26 LAB — T PALLIDUM AB TITR SER: NEGATIVE — SIGNIFICANT CHANGE UP

## 2024-04-25 NOTE — OB PROVIDER H&P - GRAVIDA, OB PROFILE
Problem: Discharge Planning  Goal: Discharge to home or other facility with appropriate resources  Outcome: Progressing     Problem: Safety - Adult  Goal: Free from fall injury  Outcome: Progressing     Problem: Skin/Tissue Integrity  Goal: Absence of new skin breakdown  Description: 1.  Monitor for areas of redness and/or skin breakdown  2.  Assess vascular access sites hourly  3.  Every 4-6 hours minimum:  Change oxygen saturation probe site  4.  Every 4-6 hours:  If on nasal continuous positive airway pressure, respiratory therapy assess nares and determine need for appliance change or resting period.  Outcome: Progressing     Problem: Chronic Conditions and Co-morbidities  Goal: Patient's chronic conditions and co-morbidity symptoms are monitored and maintained or improved  Outcome: Progressing     Problem: Cardiovascular - Adult  Goal: Maintains optimal cardiac output and hemodynamic stability  Outcome: Progressing  Goal: Absence of cardiac dysrhythmias or at baseline  Outcome: Progressing      1

## 2024-07-30 NOTE — OB PROVIDER TRIAGE NOTE - HISTORY OF PRESENT ILLNESS
Self
26yo  @ 39.5 presents with c/o painful contractions every 7 minutes. Denies LOF, VB and reports GFM, Was seen earlier today and was dc'd home at 0.5/50. Reports severity of contractions has worsened.  Reports positive COVID from 1/3. At time of disgnosis patient reported dry cough. At this time reports symptoms as no taste and no smell.     H/O  Tonsillectomy    AP course uncomplicated

## 2025-01-14 NOTE — DISCHARGE NOTE OB - MOOD SWINGS / DEPRESSION OR CRYING SPELLS LASTING MORE THAN 3 DAYS
January 14, 2025    Hello, may I speak with Lyndsey Tavares?    My name is   Tracy    I am  with [facility]  [facility]  [address] [place] [address] [phone].    Before we get started may I verify your date of birth? 1951 YES     I am calling to officially welcome you to our practice and ask about your recent visit. Is this a good time to talk? YES    Tell me about your visit with us. What things went well?  the visit was great, loved        We're always looking for ways to make our patients' experiences even better. Do you have recommendations on ways we may improve?  NO     Overall were you satisfied with your first visit to our practice? YES        I appreciate you taking the time to speak with me today. Is there anything else I can do for you?   NO    Thank you, and have a great day.      
Statement Selected